# Patient Record
Sex: FEMALE | Race: WHITE | Employment: UNEMPLOYED | URBAN - METROPOLITAN AREA
[De-identification: names, ages, dates, MRNs, and addresses within clinical notes are randomized per-mention and may not be internally consistent; named-entity substitution may affect disease eponyms.]

---

## 2017-04-17 ENCOUNTER — OFFICE VISIT (OUTPATIENT)
Dept: INTERNAL MEDICINE CLINIC | Age: 62
End: 2017-04-17

## 2017-04-17 VITALS
OXYGEN SATURATION: 99 % | BODY MASS INDEX: 27.71 KG/M2 | WEIGHT: 156.4 LBS | HEIGHT: 63 IN | RESPIRATION RATE: 16 BRPM | SYSTOLIC BLOOD PRESSURE: 118 MMHG | HEART RATE: 80 BPM | DIASTOLIC BLOOD PRESSURE: 70 MMHG

## 2017-04-17 DIAGNOSIS — Z12.11 SCREENING FOR COLORECTAL CANCER: ICD-10-CM

## 2017-04-17 DIAGNOSIS — E78.2 MIXED HYPERLIPIDEMIA: ICD-10-CM

## 2017-04-17 DIAGNOSIS — G43.009 MIGRAINE WITHOUT AURA AND WITHOUT STATUS MIGRAINOSUS, NOT INTRACTABLE: ICD-10-CM

## 2017-04-17 DIAGNOSIS — Z00.00 ROUTINE GENERAL MEDICAL EXAMINATION AT A HEALTH CARE FACILITY: Primary | ICD-10-CM

## 2017-04-17 DIAGNOSIS — Z12.12 SCREENING FOR COLORECTAL CANCER: ICD-10-CM

## 2017-04-17 DIAGNOSIS — Z12.31 VISIT FOR SCREENING MAMMOGRAM: ICD-10-CM

## 2017-04-17 PROCEDURE — 99396 PREV VISIT EST AGE 40-64: CPT | Performed by: INTERNAL MEDICINE

## 2017-04-17 RX ORDER — SUMATRIPTAN 100 MG/1
100 TABLET, FILM COATED ORAL 3 TIMES DAILY PRN
Qty: 9 TABLET | Refills: 5 | Status: SHIPPED | OUTPATIENT
Start: 2017-04-17 | End: 2018-07-27 | Stop reason: SDUPTHER

## 2017-04-17 ASSESSMENT — PATIENT HEALTH QUESTIONNAIRE - PHQ9
SUM OF ALL RESPONSES TO PHQ9 QUESTIONS 1 & 2: 0
2. FEELING DOWN, DEPRESSED OR HOPELESS: 0
1. LITTLE INTEREST OR PLEASURE IN DOING THINGS: 0
SUM OF ALL RESPONSES TO PHQ QUESTIONS 1-9: 0

## 2018-02-23 ENCOUNTER — HOSPITAL ENCOUNTER (OUTPATIENT)
Dept: CT IMAGING | Age: 63
Discharge: HOME | End: 2018-02-23
Admitting: NURSE PRACTITIONER

## 2018-02-23 DIAGNOSIS — R93.89 ABNORMAL RADIOLOGICAL FINDINGS IN SKIN AND SUBCUTANEOUS TISSUE: ICD-10-CM

## 2018-02-23 DIAGNOSIS — R79.1 ABNORMAL COAGULATION PROFILE: ICD-10-CM

## 2018-02-23 DIAGNOSIS — R07.9 CHEST PAIN, UNSPECIFIED TYPE: ICD-10-CM

## 2018-02-23 DIAGNOSIS — R06.02 SOB (SHORTNESS OF BREATH): ICD-10-CM

## 2018-02-23 LAB
GFR AFRICAN AMERICAN: >60 ML/MIN/1.73M2
GFR NON-AFRICAN AMERICAN: >60 ML/MIN/1.73M2
POC CREATININE: 0.7 MG/DL (ref 0.6–1.1)

## 2018-06-29 ENCOUNTER — HOSPITAL ENCOUNTER (OUTPATIENT)
Dept: GENERAL RADIOLOGY | Age: 63
Discharge: HOME | End: 2018-06-29
Attending: INTERNAL MEDICINE | Admitting: INTERNAL MEDICINE

## 2018-06-29 DIAGNOSIS — J91.8 PLEURAL EFFUSION ASSOCIATED WITH PANCREATITIS: ICD-10-CM

## 2018-06-29 DIAGNOSIS — K85.90 PLEURAL EFFUSION ASSOCIATED WITH PANCREATITIS: ICD-10-CM

## 2018-06-29 DIAGNOSIS — R07.1 CHEST PAIN MADE WORSE BY BREATHING: ICD-10-CM

## 2018-07-23 ENCOUNTER — HOSPITAL ENCOUNTER (OUTPATIENT)
Dept: CT IMAGING | Age: 63
Discharge: HOME | End: 2018-08-21
Attending: INTERNAL MEDICINE | Admitting: INTERNAL MEDICINE

## 2018-07-23 DIAGNOSIS — R91.8 LUNG MASS: ICD-10-CM

## 2018-07-23 LAB
GFR AFRICAN AMERICAN: >60 ML/MIN/1.73M2
GFR NON-AFRICAN AMERICAN: >60 ML/MIN/1.73M2
POC CREATININE: 0.9 MG/DL (ref 0.6–1.1)

## 2018-07-23 RX ORDER — SODIUM CHLORIDE 0.9 % (FLUSH) 0.9 %
10 SYRINGE (ML) INJECTION PRN
Status: DISCONTINUED | OUTPATIENT
Start: 2018-07-23 | End: 2018-07-24 | Stop reason: HOSPADM

## 2018-07-23 RX ADMIN — Medication 10 ML: at 16:04

## 2018-07-27 ENCOUNTER — OFFICE VISIT (OUTPATIENT)
Dept: INTERNAL MEDICINE CLINIC | Age: 63
End: 2018-07-27

## 2018-07-27 VITALS
DIASTOLIC BLOOD PRESSURE: 72 MMHG | OXYGEN SATURATION: 98 % | WEIGHT: 152.6 LBS | RESPIRATION RATE: 18 BRPM | BODY MASS INDEX: 27.04 KG/M2 | HEART RATE: 62 BPM | SYSTOLIC BLOOD PRESSURE: 126 MMHG | HEIGHT: 63 IN

## 2018-07-27 DIAGNOSIS — E78.2 MIXED HYPERLIPIDEMIA: ICD-10-CM

## 2018-07-27 DIAGNOSIS — Z12.12 SCREENING FOR COLORECTAL CANCER: ICD-10-CM

## 2018-07-27 DIAGNOSIS — I34.1 MITRAL VALVE PROLAPSE: ICD-10-CM

## 2018-07-27 DIAGNOSIS — Z12.11 SCREENING FOR COLORECTAL CANCER: ICD-10-CM

## 2018-07-27 DIAGNOSIS — Z00.00 ROUTINE GENERAL MEDICAL EXAMINATION AT A HEALTH CARE FACILITY: Primary | ICD-10-CM

## 2018-07-27 DIAGNOSIS — G43.009 MIGRAINE WITHOUT AURA AND WITHOUT STATUS MIGRAINOSUS, NOT INTRACTABLE: ICD-10-CM

## 2018-07-27 DIAGNOSIS — Z12.31 VISIT FOR SCREENING MAMMOGRAM: ICD-10-CM

## 2018-07-27 PROCEDURE — 99396 PREV VISIT EST AGE 40-64: CPT | Performed by: INTERNAL MEDICINE

## 2018-07-27 RX ORDER — SUMATRIPTAN 100 MG/1
100 TABLET, FILM COATED ORAL 3 TIMES DAILY PRN
Qty: 9 TABLET | Refills: 5 | Status: SHIPPED | OUTPATIENT
Start: 2018-07-27 | End: 2019-08-09

## 2018-07-27 ASSESSMENT — PATIENT HEALTH QUESTIONNAIRE - PHQ9
1. LITTLE INTEREST OR PLEASURE IN DOING THINGS: 0
SUM OF ALL RESPONSES TO PHQ QUESTIONS 1-9: 0
SUM OF ALL RESPONSES TO PHQ9 QUESTIONS 1 & 2: 0
2. FEELING DOWN, DEPRESSED OR HOPELESS: 0

## 2018-07-27 NOTE — PROGRESS NOTES
Normocephalic and atraumatic. Nose: Nose normal.   Mouth/Throat: Oropharynx is clear and moist, and mucous membranes are normal.  There is no cervical adenopathy. Eyes: Conjunctivae and extraocular motions are normal. Pupils are equal, round, and reactive to light. Neck: Neck supple. No JVD present. No mass and no thyromegaly present. Cardiovascular: Normal rate, regular rhythm, normal heart sounds and intact distal pulses. Exam reveals no gallop and no friction rub. No murmur heard. Pulmonary/Chest: Effort normal and breath sounds normal. No respiratory distress. She has no wheezes, rhonchi or rales. Abdominal: Soft, non-tender. Bowel sounds and aorta are normal. She exhibits no organomegaly, mass or bruit. Musculoskeletal: Normal range of motion, no synovitis. She exhibits no edema. Neurological: She is alert and oriented to person, place, and time. She has normal reflexes. No cranial nerve deficit. Skin: Skin is warm and dry. There is no rash or erythema. Psychiatric: She has a normal mood and affect. Her speech is normal and behavior is normal. Judgment, cognition and memory are normal.         Assessment/Plan:  Luz was seen today for hyperlipidemia and other. 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 Overall general medical exam appears benign, other assessments as noted and testing is as noted below. -     Lipid Panel; Future  -     Comprehensive Metabolic Panel; Future  -     TSH with Reflex; Future  -     CBC Auto Differential; Future  -     BLOOD OCCULT STOOL #1; Future    Mixed hyperlipidemia - Pt will continue to work on a low fat diet and also exercise, wt loss as appropriate. Will continue periodic monitoring of fasting lipid profile, glucose, liver function.    -     Lipid Panel;  Future  -     Comprehensive Metabolic Panel; Future  -     TSH with Reflex; Future  -     CBC Auto Differential; Future  -     BLOOD OCCULT STOOL #1; Future    Mitral valve prolapse-no current sx, monitor. Screening for colorectal cancer- due for stool cards, defers colonoscopy  -     BLOOD OCCULT STOOL #1; Future    Visit for screening mammogram  -     ESTER Screening Bilateral; Future    Migraine without aura and without status migrainosus, not intractable - Migraines sporadic but stable, controlled on meds as noted. Continue current therapy. -     SUMAtriptan (IMITREX) 100 MG tablet;  Take 1 tablet by mouth 3 times daily as needed for Migraine

## 2019-03-20 ENCOUNTER — OFFICE VISIT (OUTPATIENT)
Dept: INTERNAL MEDICINE CLINIC | Age: 64
End: 2019-03-20
Payer: COMMERCIAL

## 2019-03-20 VITALS
HEART RATE: 77 BPM | OXYGEN SATURATION: 97 % | DIASTOLIC BLOOD PRESSURE: 67 MMHG | BODY MASS INDEX: 28.87 KG/M2 | RESPIRATION RATE: 15 BRPM | WEIGHT: 163 LBS | SYSTOLIC BLOOD PRESSURE: 136 MMHG

## 2019-03-20 DIAGNOSIS — J01.00 ACUTE NON-RECURRENT MAXILLARY SINUSITIS: Primary | ICD-10-CM

## 2019-03-20 PROCEDURE — 99213 OFFICE O/P EST LOW 20 MIN: CPT | Performed by: INTERNAL MEDICINE

## 2019-03-20 RX ORDER — PREDNISONE 1 MG/1
TABLET ORAL
Qty: 36 TABLET | Refills: 0 | Status: SHIPPED | OUTPATIENT
Start: 2019-03-20 | End: 2019-08-09 | Stop reason: ALTCHOICE

## 2019-03-20 RX ORDER — AMOXICILLIN 500 MG/1
500 CAPSULE ORAL 2 TIMES DAILY
Qty: 20 CAPSULE | Refills: 0 | Status: SHIPPED | OUTPATIENT
Start: 2019-03-20 | End: 2019-03-30

## 2019-03-20 ASSESSMENT — PATIENT HEALTH QUESTIONNAIRE - PHQ9
2. FEELING DOWN, DEPRESSED OR HOPELESS: 0
SUM OF ALL RESPONSES TO PHQ9 QUESTIONS 1 & 2: 0
DEPRESSION UNABLE TO ASSESS: FUNCTIONAL CAPACITY MOTIVATION LIMITS ACCURACY
SUM OF ALL RESPONSES TO PHQ QUESTIONS 1-9: 0
1. LITTLE INTEREST OR PLEASURE IN DOING THINGS: 0
SUM OF ALL RESPONSES TO PHQ QUESTIONS 1-9: 0

## 2019-08-09 ENCOUNTER — OFFICE VISIT (OUTPATIENT)
Dept: INTERNAL MEDICINE CLINIC | Age: 64
End: 2019-08-09
Payer: COMMERCIAL

## 2019-08-09 VITALS
RESPIRATION RATE: 15 BRPM | HEIGHT: 63 IN | DIASTOLIC BLOOD PRESSURE: 83 MMHG | HEART RATE: 67 BPM | BODY MASS INDEX: 28.7 KG/M2 | WEIGHT: 162 LBS | OXYGEN SATURATION: 98 % | SYSTOLIC BLOOD PRESSURE: 138 MMHG

## 2019-08-09 DIAGNOSIS — E78.2 MIXED HYPERLIPIDEMIA: ICD-10-CM

## 2019-08-09 DIAGNOSIS — Z12.11 SCREENING FOR COLORECTAL CANCER: ICD-10-CM

## 2019-08-09 DIAGNOSIS — Z12.12 SCREENING FOR COLORECTAL CANCER: ICD-10-CM

## 2019-08-09 DIAGNOSIS — G43.009 MIGRAINE WITHOUT AURA AND WITHOUT STATUS MIGRAINOSUS, NOT INTRACTABLE: ICD-10-CM

## 2019-08-09 DIAGNOSIS — Z12.31 VISIT FOR SCREENING MAMMOGRAM: ICD-10-CM

## 2019-08-09 DIAGNOSIS — Z00.00 ROUTINE GENERAL MEDICAL EXAMINATION AT A HEALTH CARE FACILITY: Primary | ICD-10-CM

## 2019-08-09 PROCEDURE — 99396 PREV VISIT EST AGE 40-64: CPT | Performed by: INTERNAL MEDICINE

## 2019-08-09 RX ORDER — SUMATRIPTAN 100 MG/1
100 TABLET, FILM COATED ORAL 3 TIMES DAILY PRN
Qty: 9 TABLET | Refills: 5 | Status: SHIPPED | OUTPATIENT
Start: 2019-08-09 | End: 2021-10-21

## 2019-08-09 NOTE — PROGRESS NOTES
Internal Medicine  History and Physical        Luz Elliott  YOB: 1955    Date of Service:  8/9/2019      Chief Complaint:   Vero Soto is a 60 y.o. female who presents for a comprehensive physical    HPI:   Lipids:  Is continuing statin therapy and low fat diet. Tolerating medications w/o myalgias or GI upset. Work less stressful and now minimal use imitrex. Soda, coke ~2/d. Prior max 5-6/day. S/p complete hysterectomy, seeing DR Kaia Hernandez now q5 yrs. Patient Active Problem List   Diagnosis    Migraine headache    Allergic rhinitis    Hyperlipidemia    Mitral valve prolapse    S/P total hysterectomy and BSO (bilateral salpingo-oophorectomy)       Preventive Care:  Health Maintenance   Topic Date Due    Hepatitis C screen  1955    HIV screen  01/30/1972    DTaP/Tdap/Td vaccine (1 - Tdap) 01/30/1976    Shingles Vaccine (1 of 2) 01/30/2007    Colon cancer screen colonoscopy  01/30/2007    Breast cancer screen  09/30/2017    Flu vaccine (1) 09/01/2019    Lipid screen  12/16/2021    Pneumococcal 0-64 years Vaccine  Aged Out        Lipid panel:   Lab Results   Component Value Date    TRIG 200 12/16/2016    HDL 53 12/16/2016        Immunization History   Administered Date(s) Administered    Influenza 12/04/2012       Allergies   Allergen Reactions    Codeine Nausea And Vomiting    Crestor [Rosuvastatin Calcium]      myalgias    Lipitor      Elevated alk phos     No current outpatient medications on file. No current facility-administered medications for this visit.         Past Medical History:   Diagnosis Date    Allergic rhinitis     Elevated alkaline phosphatase level     H/O echocardiogram 7/23/13 7/13-EF >55% Borderline MVP, Trace MR.     Hip pain     Hyperlipidemia     Left hand pain     injury w hand stuck in an auger  early May 2012    Menopause     Migraine headache     Mitral valve prolapse     mild on echo 7/23/13    Posterior vitreous

## 2020-08-05 ENCOUNTER — TELEPHONE (OUTPATIENT)
Dept: INTERNAL MEDICINE CLINIC | Age: 65
End: 2020-08-05

## 2020-12-14 ENCOUNTER — OFFICE VISIT (OUTPATIENT)
Dept: INTERNAL MEDICINE CLINIC | Age: 65
End: 2020-12-14
Payer: COMMERCIAL

## 2020-12-14 VITALS
HEIGHT: 63 IN | OXYGEN SATURATION: 98 % | HEART RATE: 78 BPM | SYSTOLIC BLOOD PRESSURE: 124 MMHG | BODY MASS INDEX: 28.7 KG/M2 | RESPIRATION RATE: 14 BRPM | DIASTOLIC BLOOD PRESSURE: 72 MMHG

## 2020-12-14 PROCEDURE — 99213 OFFICE O/P EST LOW 20 MIN: CPT | Performed by: INTERNAL MEDICINE

## 2020-12-14 RX ORDER — VALACYCLOVIR HYDROCHLORIDE 1 G/1
1000 TABLET, FILM COATED ORAL 3 TIMES DAILY
Qty: 21 TABLET | Refills: 1 | Status: SHIPPED | OUTPATIENT
Start: 2020-12-14 | End: 2020-12-21

## 2020-12-14 RX ORDER — CEPHALEXIN 500 MG/1
500 CAPSULE ORAL 2 TIMES DAILY
Qty: 14 CAPSULE | Refills: 0 | Status: SHIPPED | OUTPATIENT
Start: 2020-12-14 | End: 2020-12-21

## 2020-12-14 ASSESSMENT — PATIENT HEALTH QUESTIONNAIRE - PHQ9
1. LITTLE INTEREST OR PLEASURE IN DOING THINGS: 0
SUM OF ALL RESPONSES TO PHQ QUESTIONS 1-9: 0
2. FEELING DOWN, DEPRESSED OR HOPELESS: 0
SUM OF ALL RESPONSES TO PHQ QUESTIONS 1-9: 0
SUM OF ALL RESPONSES TO PHQ9 QUESTIONS 1 & 2: 0
SUM OF ALL RESPONSES TO PHQ QUESTIONS 1-9: 0

## 2020-12-14 NOTE — LETTER
[facility] INTERNAL MEDICINE  [address] [address]  Phone: [phone]  Fax: [phone]    Nicolette Marrero MD        December 14, 2020     Patient: Fco Nicole   YOB: 1955   Date of Visit: 12/14/2020       To Whom it May Concern:    Kinza Hutton was seen in my clinic on 12/14/2020. She may return to work on 12/23/20. If you have any questions or concerns, please don't hesitate to call.     Sincerely,         Nicolette Marrero MD

## 2020-12-14 NOTE — PROGRESS NOTES
Fco Nicole  1955 12/14/20    SUBJECTIVE:  5d hx of sudden onset tender burning and itching cluster lesions R cheek and some spread of redness to R medial cheek to nose. OBJECTIVE:    /72   Pulse 78   Resp 14   Ht 5' 3\" (1.6 m)   SpO2 98%   BMI 28.70 kg/m²     Physical Exam  Constitutional:       Appearance: Normal appearance. HENT:      Head:     Skin:     Findings: Erythema and rash present. Neurological:      Mental Status: She is alert. ASSESSMENT:    1. Facial cellulitis    2. Herpes zoster without complication        PLAN:  BACTERIAL INFECTION ON DDX BUT I'M MOST CONCERNED ABOUT POSSIBLE SHINGLES. WORK EXC GIVEN FOR THIS WEEK MON TO SUN, RTW NEXT MON. EMPIRIC RX ABX AND VALTREX, To call back one week if not improving. Orvel Snellen ALSO IN 6MO  Luz was seen today for insect bite. Diagnoses and all orders for this visit:    Facial cellulitis  -     cephALEXin (KEFLEX) 500 MG capsule; Take 1 capsule by mouth 2 times daily for 7 days  -     valACYclovir (VALTREX) 1 g tablet; Take 1 tablet by mouth 3 times daily for 7 days    Herpes zoster without complication  -     valACYclovir (VALTREX) 1 g tablet; Take 1 tablet by mouth 3 times daily for 7 days    Other orders  -     zoster recombinant adjuvanted vaccine [facility]) 50 MCG/0.5ML SUSR injection;  Inject 0.5 mLs into the muscle once for 1 dose

## 2021-04-28 ENCOUNTER — TELEPHONE (OUTPATIENT)
Dept: INTERNAL MEDICINE CLINIC | Age: 66
End: 2021-04-28

## 2021-07-29 ENCOUNTER — TELEPHONE (OUTPATIENT)
Dept: INTERNAL MEDICINE CLINIC | Age: 66
End: 2021-07-29

## 2021-10-21 ENCOUNTER — APPOINTMENT (OUTPATIENT)
Dept: GENERAL RADIOLOGY | Age: 66
End: 2021-10-21

## 2021-10-21 ENCOUNTER — APPOINTMENT (OUTPATIENT)
Dept: CT IMAGING | Age: 66
End: 2021-10-21

## 2021-10-21 ENCOUNTER — HOSPITAL ENCOUNTER (OUTPATIENT)
Age: 66
Setting detail: OBSERVATION
Discharge: HOME | End: 2021-10-22
Attending: STUDENT IN AN ORGANIZED HEALTH CARE EDUCATION/TRAINING PROGRAM | Admitting: STUDENT IN AN ORGANIZED HEALTH CARE EDUCATION/TRAINING PROGRAM

## 2021-10-21 DIAGNOSIS — R51.9 NONINTRACTABLE HEADACHE, UNSPECIFIED CHRONICITY PATTERN, UNSPECIFIED HEADACHE TYPE: ICD-10-CM

## 2021-10-21 DIAGNOSIS — R00.2 PALPITATIONS: ICD-10-CM

## 2021-10-21 DIAGNOSIS — R03.0 ELEVATED BLOOD PRESSURE READING: ICD-10-CM

## 2021-10-21 DIAGNOSIS — R53.83 OTHER FATIGUE: Primary | ICD-10-CM

## 2021-10-21 DIAGNOSIS — J18.9 PNEUMONIA OF RIGHT MIDDLE LOBE DUE TO INFECTIOUS ORGANISM: ICD-10-CM

## 2021-10-21 PROBLEM — R94.31 EKG ABNORMALITIES: Status: ACTIVE | Noted: 2021-10-21

## 2021-10-21 LAB
ALBUMIN SERPL-MCNC: 4.5 GM/DL (ref 3.4–5)
ALP BLD-CCNC: 155 IU/L (ref 40–129)
ALT SERPL-CCNC: 29 U/L (ref 10–40)
ANION GAP SERPL CALCULATED.3IONS-SCNC: 10 MMOL/L (ref 4–16)
APTT: 31 SECONDS (ref 25.1–37.1)
AST SERPL-CCNC: 22 IU/L (ref 15–37)
BASOPHILS ABSOLUTE: 0.1 K/CU MM
BASOPHILS RELATIVE PERCENT: 0.7 % (ref 0–1)
BILIRUB SERPL-MCNC: 0.7 MG/DL (ref 0–1)
BILIRUBIN DIRECT: 0.2 MG/DL (ref 0–0.3)
BILIRUBIN, INDIRECT: 0.5 MG/DL (ref 0–0.7)
BUN BLDV-MCNC: 15 MG/DL (ref 6–23)
CALCIUM SERPL-MCNC: 10.1 MG/DL (ref 8.3–10.6)
CHLORIDE BLD-SCNC: 104 MMOL/L (ref 99–110)
CO2: 25 MMOL/L (ref 21–32)
CREAT SERPL-MCNC: 0.6 MG/DL (ref 0.6–1.1)
DIFFERENTIAL TYPE: ABNORMAL
EOSINOPHILS ABSOLUTE: 0 K/CU MM
EOSINOPHILS RELATIVE PERCENT: 0.6 % (ref 0–3)
GFR AFRICAN AMERICAN: >60 ML/MIN/1.73M2
GFR NON-AFRICAN AMERICAN: >60 ML/MIN/1.73M2
GLUCOSE BLD-MCNC: 125 MG/DL (ref 70–99)
HCT VFR BLD CALC: 46 % (ref 37–47)
HEMOGLOBIN: 15 GM/DL (ref 12.5–16)
IMMATURE NEUTROPHIL %: 0.8 % (ref 0–0.43)
INR BLD: 0.82 INDEX
LYMPHOCYTES ABSOLUTE: 1.7 K/CU MM
LYMPHOCYTES RELATIVE PERCENT: 23.4 % (ref 24–44)
MCH RBC QN AUTO: 31.4 PG (ref 27–31)
MCHC RBC AUTO-ENTMCNC: 32.6 % (ref 32–36)
MCV RBC AUTO: 96.4 FL (ref 78–100)
MONOCYTES ABSOLUTE: 0.8 K/CU MM
MONOCYTES RELATIVE PERCENT: 10.7 % (ref 0–4)
NUCLEATED RBC %: 0 %
PDW BLD-RTO: 12.5 % (ref 11.7–14.9)
PLATELET # BLD: 315 K/CU MM (ref 140–440)
PMV BLD AUTO: 9.3 FL (ref 7.5–11.1)
POTASSIUM SERPL-SCNC: 3.8 MMOL/L (ref 3.5–5.1)
PRO-BNP: 55.34 PG/ML
PROCALCITONIN: 0.06
PROTHROMBIN TIME: 10.6 SECONDS (ref 11.7–14.5)
RBC # BLD: 4.77 M/CU MM (ref 4.2–5.4)
SARS-COV-2, NAAT: NOT DETECTED
SEGMENTED NEUTROPHILS ABSOLUTE COUNT: 4.6 K/CU MM
SEGMENTED NEUTROPHILS RELATIVE PERCENT: 63.8 % (ref 36–66)
SODIUM BLD-SCNC: 139 MMOL/L (ref 135–145)
SOURCE: NORMAL
T4 FREE: 1.17 NG/DL (ref 0.9–1.8)
TOTAL IMMATURE NEUTOROPHIL: 0.06 K/CU MM
TOTAL NUCLEATED RBC: 0 K/CU MM
TOTAL PROTEIN: 6.9 GM/DL (ref 6.4–8.2)
TROPONIN T: <0.01 NG/ML
TSH HIGH SENSITIVITY: 1.04 UIU/ML (ref 0.27–4.2)
WBC # BLD: 7.1 K/CU MM (ref 4–10.5)

## 2021-10-21 PROCEDURE — 93005 ELECTROCARDIOGRAM TRACING: CPT | Performed by: EMERGENCY MEDICINE

## 2021-10-21 PROCEDURE — 84484 ASSAY OF TROPONIN QUANT: CPT

## 2021-10-21 PROCEDURE — 80048 BASIC METABOLIC PNL TOTAL CA: CPT

## 2021-10-21 PROCEDURE — 85730 THROMBOPLASTIN TIME PARTIAL: CPT

## 2021-10-21 PROCEDURE — 84439 ASSAY OF FREE THYROXINE: CPT

## 2021-10-21 PROCEDURE — 99285 EMERGENCY DEPT VISIT HI MDM: CPT

## 2021-10-21 PROCEDURE — G0378 HOSPITAL OBSERVATION PER HR: HCPCS

## 2021-10-21 PROCEDURE — 87635 SARS-COV-2 COVID-19 AMP PRB: CPT

## 2021-10-21 PROCEDURE — 84145 PROCALCITONIN (PCT): CPT

## 2021-10-21 PROCEDURE — 71046 X-RAY EXAM CHEST 2 VIEWS: CPT

## 2021-10-21 PROCEDURE — 70450 CT HEAD/BRAIN W/O DYE: CPT

## 2021-10-21 PROCEDURE — 80076 HEPATIC FUNCTION PANEL: CPT

## 2021-10-21 PROCEDURE — 84443 ASSAY THYROID STIM HORMONE: CPT

## 2021-10-21 PROCEDURE — 83880 ASSAY OF NATRIURETIC PEPTIDE: CPT

## 2021-10-21 PROCEDURE — 85025 COMPLETE CBC W/AUTO DIFF WBC: CPT

## 2021-10-21 PROCEDURE — 6370000000 HC RX 637 (ALT 250 FOR IP): Performed by: INTERNAL MEDICINE

## 2021-10-21 PROCEDURE — 36415 COLL VENOUS BLD VENIPUNCTURE: CPT

## 2021-10-21 PROCEDURE — 85610 PROTHROMBIN TIME: CPT

## 2021-10-21 RX ORDER — CARVEDILOL 6.25 MG/1
12.5 TABLET ORAL 2 TIMES DAILY WITH MEALS
Status: DISCONTINUED | OUTPATIENT
Start: 2021-10-21 | End: 2021-10-22 | Stop reason: HOSPADM

## 2021-10-21 RX ORDER — ASPIRIN 81 MG/1
81 TABLET, CHEWABLE ORAL DAILY
Status: DISCONTINUED | OUTPATIENT
Start: 2021-10-22 | End: 2021-10-22 | Stop reason: HOSPADM

## 2021-10-21 RX ORDER — ONDANSETRON 4 MG/1
4 TABLET, ORALLY DISINTEGRATING ORAL EVERY 8 HOURS PRN
Status: DISCONTINUED | OUTPATIENT
Start: 2021-10-21 | End: 2021-10-22 | Stop reason: HOSPADM

## 2021-10-21 RX ORDER — SODIUM CHLORIDE 0.9 % (FLUSH) 0.9 %
5-40 SYRINGE (ML) INJECTION PRN
Status: DISCONTINUED | OUTPATIENT
Start: 2021-10-21 | End: 2021-10-22 | Stop reason: HOSPADM

## 2021-10-21 RX ORDER — ACETAMINOPHEN 650 MG/1
650 SUPPOSITORY RECTAL EVERY 6 HOURS PRN
Status: DISCONTINUED | OUTPATIENT
Start: 2021-10-21 | End: 2021-10-22 | Stop reason: HOSPADM

## 2021-10-21 RX ORDER — NITROGLYCERIN 0.4 MG/1
0.4 TABLET SUBLINGUAL EVERY 5 MIN PRN
Status: DISCONTINUED | OUTPATIENT
Start: 2021-10-21 | End: 2021-10-22 | Stop reason: HOSPADM

## 2021-10-21 RX ORDER — SODIUM CHLORIDE 0.9 % (FLUSH) 0.9 %
5-40 SYRINGE (ML) INJECTION EVERY 12 HOURS SCHEDULED
Status: DISCONTINUED | OUTPATIENT
Start: 2021-10-21 | End: 2021-10-22 | Stop reason: HOSPADM

## 2021-10-21 RX ORDER — LISINOPRIL 5 MG/1
5 TABLET ORAL DAILY
Status: DISCONTINUED | OUTPATIENT
Start: 2021-10-21 | End: 2021-10-22

## 2021-10-21 RX ORDER — POLYETHYLENE GLYCOL 3350 17 G/17G
17 POWDER, FOR SOLUTION ORAL DAILY PRN
Status: DISCONTINUED | OUTPATIENT
Start: 2021-10-21 | End: 2021-10-22 | Stop reason: HOSPADM

## 2021-10-21 RX ORDER — LABETALOL HYDROCHLORIDE 5 MG/ML
5 INJECTION, SOLUTION INTRAVENOUS EVERY 10 MIN PRN
Status: DISCONTINUED | OUTPATIENT
Start: 2021-10-21 | End: 2021-10-22 | Stop reason: HOSPADM

## 2021-10-21 RX ORDER — SODIUM CHLORIDE 9 MG/ML
25 INJECTION, SOLUTION INTRAVENOUS PRN
Status: DISCONTINUED | OUTPATIENT
Start: 2021-10-21 | End: 2021-10-22 | Stop reason: HOSPADM

## 2021-10-21 RX ORDER — ACETAMINOPHEN 325 MG/1
650 TABLET ORAL EVERY 6 HOURS PRN
Status: DISCONTINUED | OUTPATIENT
Start: 2021-10-21 | End: 2021-10-22 | Stop reason: HOSPADM

## 2021-10-21 RX ORDER — ONDANSETRON 2 MG/ML
4 INJECTION INTRAMUSCULAR; INTRAVENOUS EVERY 6 HOURS PRN
Status: DISCONTINUED | OUTPATIENT
Start: 2021-10-21 | End: 2021-10-22 | Stop reason: HOSPADM

## 2021-10-21 RX ADMIN — LISINOPRIL 5 MG: 5 TABLET ORAL at 20:10

## 2021-10-21 RX ADMIN — CARVEDILOL 12.5 MG: 6.25 TABLET, FILM COATED ORAL at 20:10

## 2021-10-21 ASSESSMENT — ENCOUNTER SYMPTOMS
NAUSEA: 1
COUGH: 1
COLOR CHANGE: 0
VOICE CHANGE: 0
DIARRHEA: 0
CHEST TIGHTNESS: 0
SORE THROAT: 0
VOMITING: 0
ABDOMINAL PAIN: 0
RHINORRHEA: 0
BLOOD IN STOOL: 0
BACK PAIN: 0
CONSTIPATION: 0
SINUS PRESSURE: 0
WHEEZING: 0
TROUBLE SWALLOWING: 0
SINUS PAIN: 0
SHORTNESS OF BREATH: 0

## 2021-10-21 NOTE — ED NOTES
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 Pt states she doesn't have a hx of HTN but states she has a strong family hx of HTN. Pt reports that her BP was 168/94 at the office and they asked if they could do an EKG on her. Pt states the EKG was \"abnormal\" and was told to come to ED for evaluation and to be seen by Dr Fabrice Farias. Pt denies any chest pain or SOB. Pt states she did feel dizzy yesterday but denies today. Pt reports she takes no medications on a daily basis.       Radha Davenport, RN  10/21/21 1946

## 2021-10-21 NOTE — Clinical Note
Patient Class: Observation [104]   REQUIRED: Diagnosis: EKG abnormalities [827640]   Estimated Length of Stay: Estimated stay of less than 2 midnights   Admitting Provider: Christianne Herron [[id number]]   Preferred Department: ed obs   Telemetry/Cardiac Monitoring Required?: Yes

## 2021-10-21 NOTE — CONSULTS
Dictated --[id number]  Discussed with PA-in ER  Check ct head and labs  Will make decision based on ct and labs    Electronically signed by Kim Wright MD on 10/21/21 at 3:45 PM EDT

## 2021-10-21 NOTE — CONSULTS
[address] [place] [address], [address] [facility]                                  CONSULTATION    PATIENT NAME: Christine Marcelino                       :        1955  MED REC NO:   [id number]                          ROOM:  ACCOUNT NO:   [de-identified]                           ADMIT DATE: 10/21/2021  PROVIDER:     Regan Pelayo MD    CONSULT DATE:  10/21/2021    HISTORY OF PRESENT ILLNESS:  This is a 42-year-old female patient, who  was seen in the office and sent from the office for further evaluation. The patient works _____. The patient has been having headache for the  last 3 days and dizziness present. She came to the office and her blood  pressure was checked, it was 168/98. She was also complaining of having  palpitations and shortness of breath present. Therefore, she was sent  to the hospital for further evaluation. Her EKG has some ST changes  present. The patient had an echo done back in  and I believe her echo was  normal at that time. She did have a stress test done in  and her  stress test was negative for ischemia. LV function also was preserved. The patient does not take any medications. PAST MEDICAL HISTORY:  She has no history of stroke. No history of  seizures. No history of MI. She does have new onset of hypertension  present. She has had elevated LFTs secondary to statin and I think that  she stopped taking that and it has improved. She had a stress test done  in  and the stress test was negative for ischemia. It was a  treadmill stress test and for 9 minutes, she walked on the treadmill. No EKG changes were noted. PAST SURGICAL HISTORY:  She had a gallbladder surgery and hysterectomy. SOCIAL HISTORY:  She smokes. ALLERGIES:  CRESTOR, CALCIUM, and LIPITOR. MEDICATIONS:  She is on Imitrex.     PHYSICAL EXAMINATION:  GENERAL:  The patient is awake, alert, and answers all questions, not in  acute distress. VITAL SIGNS:  Temperature afebrile, pulse is 75, blood pressure is  160/90. HEENT:  Head is normocephalic and atraumatic. Pupils are equal and  reactive to light. CHEST:  Equal expansion. LUNGS:  Clear to auscultation. No wheezing or rhonchi noted. HEART:  Regular rate and rhythm. ABDOMEN:  Soft and nontender. Bowel sounds are present. No  hepatosplenomegaly or guarding appreciated. EXTREMITIES:  No cyanosis noted. NEUROLOGIC:  Cranial nerves are grossly intact. DIAGNOSTIC STUDIES:  Her EKG is pending. IMPRESSION:  A 62-year-old female patient who comes in with having  dizziness, lightheadedness and headache present, and also elevated blood  pressure. PLAN:  At this time, I will wait for her CT of the head and labs to come  back and we will make further recommendations based on that. We will  probably obtain an echo. We will start blood pressure medication.    Discussed with the patient and also the ER provider and the PA.        Isha Jorge MD    D: 10/21/2021 15:44:20       T: 10/21/2021 15:47:24     [email or web address]  Job#: [id number]     Doc#: [id number]    CC:

## 2021-10-22 ENCOUNTER — APPOINTMENT (OUTPATIENT)
Dept: NUCLEAR MEDICINE | Age: 66
End: 2021-10-22

## 2021-10-22 VITALS
OXYGEN SATURATION: 97 % | WEIGHT: 154.76 LBS | RESPIRATION RATE: 16 BRPM | HEIGHT: 63 IN | HEART RATE: 56 BPM | BODY MASS INDEX: 27.42 KG/M2 | SYSTOLIC BLOOD PRESSURE: 119 MMHG | TEMPERATURE: 97.6 F | DIASTOLIC BLOOD PRESSURE: 61 MMHG

## 2021-10-22 LAB
CHOLESTEROL: 236 MG/DL
EKG ATRIAL RATE: 51 BPM
EKG ATRIAL RATE: 65 BPM
EKG DIAGNOSIS: NORMAL
EKG DIAGNOSIS: NORMAL
EKG P AXIS: 66 DEGREES
EKG P AXIS: 68 DEGREES
EKG P-R INTERVAL: 168 MS
EKG P-R INTERVAL: 182 MS
EKG Q-T INTERVAL: 398 MS
EKG Q-T INTERVAL: 474 MS
EKG QRS DURATION: 84 MS
EKG QRS DURATION: 86 MS
EKG QTC CALCULATION (BAZETT): 413 MS
EKG QTC CALCULATION (BAZETT): 436 MS
EKG R AXIS: 38 DEGREES
EKG R AXIS: 46 DEGREES
EKG T AXIS: 70 DEGREES
EKG T AXIS: 70 DEGREES
EKG VENTRICULAR RATE: 51 BPM
EKG VENTRICULAR RATE: 65 BPM
HCT VFR BLD CALC: 42.2 % (ref 37–47)
HDLC SERPL-MCNC: 49 MG/DL
HEMOGLOBIN: 13.7 GM/DL (ref 12.5–16)
LDL CHOLESTEROL DIRECT: 164 MG/DL
LV EF: 53 %
LV EF: 63 %
LVEF MODALITY: NORMAL
LVEF MODALITY: NORMAL
MCH RBC QN AUTO: 31.4 PG (ref 27–31)
MCHC RBC AUTO-ENTMCNC: 32.5 % (ref 32–36)
MCV RBC AUTO: 96.6 FL (ref 78–100)
PDW BLD-RTO: 12.6 % (ref 11.7–14.9)
PLATELET # BLD: 284 K/CU MM (ref 140–440)
PMV BLD AUTO: 9.1 FL (ref 7.5–11.1)
RBC # BLD: 4.37 M/CU MM (ref 4.2–5.4)
TRIGL SERPL-MCNC: 158 MG/DL
TROPONIN T: <0.01 NG/ML
TROPONIN T: <0.01 NG/ML
WBC # BLD: 7.4 K/CU MM (ref 4–10.5)

## 2021-10-22 PROCEDURE — 80061 LIPID PANEL: CPT

## 2021-10-22 PROCEDURE — 36415 COLL VENOUS BLD VENIPUNCTURE: CPT

## 2021-10-22 PROCEDURE — 2580000003 HC RX 258: Performed by: STUDENT IN AN ORGANIZED HEALTH CARE EDUCATION/TRAINING PROGRAM

## 2021-10-22 PROCEDURE — 93017 CV STRESS TEST TRACING ONLY: CPT

## 2021-10-22 PROCEDURE — G0378 HOSPITAL OBSERVATION PER HR: HCPCS

## 2021-10-22 PROCEDURE — 84484 ASSAY OF TROPONIN QUANT: CPT

## 2021-10-22 PROCEDURE — 78452 HT MUSCLE IMAGE SPECT MULT: CPT

## 2021-10-22 PROCEDURE — 93010 ELECTROCARDIOGRAM REPORT: CPT | Performed by: INTERNAL MEDICINE

## 2021-10-22 PROCEDURE — 85027 COMPLETE CBC AUTOMATED: CPT

## 2021-10-22 PROCEDURE — 93306 TTE W/DOPPLER COMPLETE: CPT

## 2021-10-22 PROCEDURE — 3430000000 HC RX DIAGNOSTIC RADIOPHARMACEUTICAL: Performed by: PHYSICIAN ASSISTANT

## 2021-10-22 PROCEDURE — 83721 ASSAY OF BLOOD LIPOPROTEIN: CPT

## 2021-10-22 PROCEDURE — 94761 N-INVAS EAR/PLS OXIMETRY MLT: CPT

## 2021-10-22 PROCEDURE — A9500 TC99M SESTAMIBI: HCPCS | Performed by: PHYSICIAN ASSISTANT

## 2021-10-22 PROCEDURE — 93005 ELECTROCARDIOGRAM TRACING: CPT | Performed by: STUDENT IN AN ORGANIZED HEALTH CARE EDUCATION/TRAINING PROGRAM

## 2021-10-22 RX ORDER — ASPIRIN 81 MG/1
81 TABLET, CHEWABLE ORAL DAILY
Qty: 30 TABLET | Refills: 0 | Status: SHIPPED | OUTPATIENT
Start: 2021-10-23

## 2021-10-22 RX ORDER — CARVEDILOL 12.5 MG/1
12.5 TABLET ORAL 2 TIMES DAILY WITH MEALS
Qty: 60 TABLET | Refills: 0 | Status: SHIPPED | OUTPATIENT
Start: 2021-10-22 | End: 2021-10-25 | Stop reason: SDUPTHER

## 2021-10-22 RX ADMIN — KIT FOR THE PREPARATION OF TECHNETIUM TC99M SESTAMIBI 10 MILLICURIE: 1 INJECTION, POWDER, LYOPHILIZED, FOR SOLUTION PARENTERAL at 10:06

## 2021-10-22 RX ADMIN — KIT FOR THE PREPARATION OF TECHNETIUM TC99M SESTAMIBI 30 MILLICURIE: 1 INJECTION, POWDER, LYOPHILIZED, FOR SOLUTION PARENTERAL at 10:05

## 2021-10-22 RX ADMIN — SODIUM CHLORIDE, PRESERVATIVE FREE 10 ML: 5 INJECTION INTRAVENOUS at 10:39

## 2021-10-22 NOTE — H&P
History and Physical      Name:  Pete Agosto /Age/Sex: 1955  (61 y.o. female)   MRN & CSN:  [id number] & [id number] Admission Date/Time: 10/21/2021  7:30 PM   Location:  01TR- PCP: Kelley Mora MD       Hospital Day: 1    Assessment and Plan:   Pete Agosto is a 61 y.o.  female  who presents with EKG abnormalities    1. EKG abnormalities  2. Hypertension, new  3. HLD, allergy to statins  4. Palpitations  -Admit to observation unit with telemetry  -Patient was evaluated at Dr. Bibi Kong office prior to being sent in.  ED consulted on the patient in the ED would like patient to be evaluated with echo in a.m. and he started her on carvedilol and lisinopril. Watch for any decrease in renal function. -EKG findings consistent with ST abnormalities  -Denies any chest pain  -Troponin <0.010 in ED, cycle troponin x2 q6  -Start ASA, Continue Beta blocker and ACE inhibitor. Allergy to statins  -SL nitro prn  -Nasal cannula oxygen prn  -Check lipid panel. TSH 1.040 and T4 1.17  -NPO after midnight  -2D Echo in AM  -Labetalol 5 mg IV push as needed HTN for SBP greater than 220 or DBP greater than 120 with hold precautions. 5. Dizziness, likely BPPV  -Patient had dizziness yesterday that was present at rest and with eyes closed. No history of vertigo or prior CVAs.  -Dizziness was present when she went to sleep on 10/20/2021 and was resolved upon waking 10/21/2021.  -CT head without contrast shows no acute intracranial abnormalities  -Neuro exam unremarkable no nystagmus noted.  -Low suspicion for posterior CVA. We will evaluate with neuro checks overnight.  -Can consider MRI in a.m. if warranted  -Patient notified to tell nursing staff if she has any further incidences. 6. Tobacco abuse  7.  Radiographic evidence of pneumonia  -Educate patient on importance of smoking cessation for risk factor reduction  -Radiographic evidence of right middle lobe infiltrate although patient has only complaint of \"smoker's cough\" without sputum production. Denies fevers, chills, or shortness of breath. No physical exam findings concerning for pneumonia.  -Procalcitonin 0.062 and Covid negative.  -Withholding antibiotics at this time  -Notified patient to update nursing staff if she has any shortness of breath or urine production    Other chronic medical conditions:   Migraines   Persistent alkaline phosphatase elevation    Diet No diet orders on file   DVT Prophylaxis [x] Lovenox, []  Heparin, [] SCDs, [] Ambulation   GI Prophylaxis [] PPI,  [] H2 Blocker,  [] Carafate,  [] Diet/Tube Feeds   Code Status No Order   Disposition Patient requires continued admission due to EKG abnormalities   MDM [] Low, [] Moderate,[x]  High  Patient's risk as above due to acuity of condition with potential for decompensation. History of Present Illness:     Chief Complaint: EKG abnormalities    Mike Tobias is a 61 y.o.  female with a reviewed and a contributory family history of hypertension and heart disease and a PMH as stated above, who presents with complaints of fatigue, headaches, dizziness, and hypertension. Patient states symptoms began on 10/19/2021 when the patient woke up with a migraine headache that was not worse of her life or sudden onset. States she took her over-the-counter medications which did not resolve the migraine. Patient again woke up the next day with a similar headache but that evening had a spell of dizziness. Patient states \"the room was spinning despite sitting still, even when I close my eyes the room was still spinning and that was nauseous. \"  Patient states she has had no prior history of vertigo or CVAs. Patient states she went to bed yesterday and still had the dizziness but when she awoke this morning dizziness was completely resolved. Patient denied any tinnitus, blurry vision, facial droop, dysarthria, unilateral numbness or weakness, or chest pain prior to this event.   Patient states today she still had the headache and was concerned about her blood pressure as her relatives with migraines have elevated blood pressure during the event. She states she has a family history of cardiac disease as well and she was concerned about that. She states she went next-door to the cardiologist office who checked her blood pressure which was 168/94 with no history of prior hypertension. Patient states they did an EKG and that it was abnormal and to go to the ED department. Patient has endorsed occasional episodes of \"skipped beats\" but overtly denies chest pain, chest heaviness, left arm/jaw pain, shortness of breath, or diaphoresis. Patient does endorse chronic smoker's cough but denies shortness of breath, fevers, or chills. Otherwise patient denies abdominal pain, emesis, diarrhea, dark tarry stool, blood per rectum, dysuria, frequency, or urgency. Discussed case with ED provider. ROS:   Review of Systems   Constitutional: Positive for fatigue. Negative for appetite change, chills and fever. HENT: Negative for congestion, hearing loss, rhinorrhea, sinus pressure, sinus pain, sore throat, tinnitus, trouble swallowing and voice change. Eyes: Negative for visual disturbance. Respiratory: Positive for cough. Negative for chest tightness, shortness of breath and wheezing. Cardiovascular: Positive for palpitations. Negative for chest pain and leg swelling. Gastrointestinal: Positive for nausea. Negative for abdominal pain, blood in stool, constipation, diarrhea and vomiting. Genitourinary: Negative for dysuria, frequency, hematuria and urgency. Musculoskeletal: Negative for arthralgias and back pain. Skin: Negative for color change, pallor and rash. Neurological: Positive for dizziness (Not present today) and headaches. Negative for tremors, seizures, syncope, facial asymmetry, speech difficulty, weakness, light-headedness and numbness.    Psychiatric/Behavioral: Negative for confusion. Objective:   No intake or output data in the 24 hours ending 10/21/21 2147   Vitals:   Vitals:    10/21/21 2016   BP: (!) 166/92   Pulse: 85   Resp: (!) 31   Temp:    SpO2: 98%     BP (!) 166/92   Pulse 85   Temp 98.4 °F (36.9 °C) (Oral)   Resp (!) 31   Ht 5' 3\" (1.6 m)   Wt 158 lb (71.7 kg)   SpO2 98%   BMI 27.99 kg/m²   Physical Exam:   Physical Exam  Vitals and nursing note reviewed. Constitutional:       General: She is awake. She is not in acute distress. Appearance: Normal appearance. She is overweight. She is not ill-appearing, toxic-appearing or diaphoretic. Interventions: She is not intubated. HENT:      Head: Atraumatic. Right Ear: External ear normal.      Left Ear: External ear normal.      Nose: Nose normal. No rhinorrhea. Mouth/Throat:      Mouth: Mucous membranes are moist.      Tongue: Tongue does not deviate from midline. Pharynx: Uvula midline. Eyes:      General: No scleral icterus. Extraocular Movements: Extraocular movements intact. Conjunctiva/sclera: Conjunctivae normal.      Pupils: Pupils are equal, round, and reactive to light. Cardiovascular:      Rate and Rhythm: Normal rate and regular rhythm. Pulses: Normal pulses. Heart sounds: Normal heart sounds. No murmur heard. No gallop. Pulmonary:      Effort: Pulmonary effort is normal. No tachypnea, accessory muscle usage, prolonged expiration or respiratory distress. She is not intubated. Breath sounds: Normal breath sounds. No wheezing, rhonchi or rales. Abdominal:      General: Bowel sounds are normal. There is no distension. Palpations: Abdomen is soft. Tenderness: There is no abdominal tenderness. There is no guarding or rebound. Negative signs include Mckeon's sign and Rovsing's sign. Musculoskeletal:         General: Normal range of motion. Cervical back: Neck supple. Right lower leg: No edema. Left lower leg: No edema. Skin:     General: Skin is warm and dry. Capillary Refill: Capillary refill takes less than 2 seconds. Neurological:      General: No focal deficit present. Mental Status: She is alert and oriented to person, place, and time. Mental status is at baseline. Cranial Nerves: Cranial nerves are intact. No cranial nerve deficit, dysarthria or facial asymmetry. Sensory: Sensation is intact. No sensory deficit. Motor: Motor function is intact. No weakness, tremor, seizure activity or pronator drift. Coordination: Coordination is intact. Finger-Nose-Finger Test normal.   Psychiatric:         Attention and Perception: She is attentive. Mood and Affect: Mood is not anxious. Speech: She is communicative. Speech is not slurred. Behavior: Behavior is cooperative. Past Medical History:      Past Medical History:   Diagnosis Date    Allergic rhinitis     Elevated alkaline phosphatase level     H/O echocardiogram 13-EF >55% Borderline MVP, Trace MR.     Hip pain     Hyperlipidemia     Left hand pain     injury w hand stuck in an auger  early May 2012    Menopause     Migraine headache     Mitral valve prolapse     mild on echo 13    Posterior vitreous detachment     R eye, Dr Yeny Armstrong S/P total hysterectomy and BSO (bilateral salpingo-oophorectomy)      PSHX:  has a past surgical history that includes Hysterectomy (); Nose surgery ();  section (); Tubal ligation (); Hand surgery (); Tonsillectomy (); Cholecystectomy, laparoscopic (); and other surgical history (). Allergies: Allergies   Allergen Reactions    Codeine Nausea And Vomiting    Crestor [Rosuvastatin Calcium]      myalgias    Lipitor      Elevated alk phos       FAM HX: family history includes Elevated Lipids in her mother; Heart Disease in her father; Hypertension in her mother.   Soc HX:   Social History     Socioeconomic History    Marital status:      Spouse name: None    Number of children: None    Years of education: None    Highest education level: None   Occupational History    Occupation: prior     Comment: [employer]    Occupation: Medical professional resources     Comment: medical billing   Tobacco Use    Smoking status: Current Every Day Smoker     Packs/day: 0.25     Types: Cigarettes    Smokeless tobacco: Never Used   Substance and Sexual Activity    Alcohol use: Not Currently     Comment: very Infrequent    Drug use: Never    Sexual activity: None   Other Topics Concern    None   Social History Narrative    None     Social Determinants of Health     Financial Resource Strain:     Difficulty of Paying Living Expenses:    Food Insecurity:     Worried About Running Out of Food in the Last Year:     Ran Out of Food in the Last Year:    Transportation Needs:     Lack of Transportation (Medical):      Lack of Transportation (Non-Medical):    Physical Activity:     Days of Exercise per Week:     Minutes of Exercise per Session:    Stress:     Feeling of Stress :    Social Connections:     Frequency of Communication with Friends and Family:     Frequency of Social Gatherings with Friends and Family:     Attends Zoroastrianism Services:     Active Member of Clubs or Organizations:     Attends Club or Organization Meetings:     Marital Status:    Intimate Partner Violence:     Fear of Current or Ex-Partner:     Emotionally Abused:     Physically Abused:     Sexually Abused:        Data:   CBC with Differential:    Lab Results   Component Value Date    WBC 7.1 10/21/2021    RBC 4.77 10/21/2021    HGB 15.0 10/21/2021    HCT 46.0 10/21/2021     10/21/2021    MCV 96.4 10/21/2021    MCH 31.4 10/21/2021    MCHC 32.6 10/21/2021    RDW 12.5 10/21/2021    SEGSPCT 63.8 10/21/2021    LYMPHOPCT 23.4 10/21/2021    MONOPCT 10.7 10/21/2021    BASOPCT 0.7 10/21/2021    MONOSABS 0.8 10/21/2021    LYMPHSABS 1.7 10/21/2021    EOSABS 0.0 10/21/2021    BASOSABS 0.1 10/21/2021    DIFFTYPE AUTOMATED DIFFERENTIAL 10/21/2021       CMP:     Lab Results   Component Value Date     10/21/2021    K 3.8 10/21/2021     10/21/2021    CO2 25 10/21/2021    BUN 15 10/21/2021    CREATININE 0.6 10/21/2021    GFRAA >60 10/21/2021    AGRATIO 1.7 12/16/2016    LABGLOM >60 10/21/2021    LABGLOM 81 12/16/2016    GLUCOSE 125 10/21/2021    PROT 6.9 10/21/2021    LABALBU 4.5 10/21/2021    CALCIUM 10.1 10/21/2021    BILITOT 0.7 10/21/2021    ALKPHOS 155 10/21/2021    AST 22 10/21/2021    ALT 29 10/21/2021       Troponin:  Lab Results   Component Value Date    TROPONINT <0.010 10/21/2021     Radiology results:  CT HEAD WO CONTRAST   Final Result   1. No acute intracranial abnormality. XR CHEST (2 VW)   Final Result   Likely right middle lobe infiltrate consistent with pneumonia. No active   pleural disease. Medications:   Home Medications:   Prior to Admission medications    Not on File     Medications:    carvedilol  12.5 mg Oral BID WC    lisinopril  5 mg Oral Daily      Infusions:   PRN Meds:      Electronically signed by Sintia Palmer DO on 10/21/2021 at 9:47 PM      This dictation was created with voice recognition software. While attempts have been made to review the dictation as it is transcribed, on occasion the spoken word can be misinterpreted by the technology leading to omissions or inappropriate words, phrases or sentences.

## 2021-10-22 NOTE — CARE COORDINATION
CM into see pt and pt is not in room. Per chart, pt has a PCP. Pt has insurance and able to obtain prescriptions. Pt from home.  [address]

## 2021-10-22 NOTE — PROGRESS NOTES
Daily Progress Note     Pt. Awake, alert and feeling ok  HR stable, NSR in the mid 60's on exam, BP stable  No CP, SOB today  Ok to d/c home  Keep on coreg   Stress test negative for ischemia normal EF  Ct head negative  Headache due to elevated BP  Close follow up for BP management  Will hold off on Statin due to hx of liver  Injury in past with statins   F/u in office next week  She has good functional capacity on stress test-no ekg changes     ST depression    Significant ST depression noted on inferolateral leads at the office    Had been very dizzy and HA    Sent to ER to make sure no UA or NSTEMI    Trop neg. Echo and stress done today and reassuring overall    Believe it was all BP induced    HTN    On coreg now    Stopped ACE as BP started getting low    Keep current meds    LDL noted- does not tolerate statins-attempt lifestyle adjustment first, also need to stop smoking  Stable from cardiac standpoint-ok to D/C today  She will come to office on Monday for repeat EKG to ensure it is stable    PAST MEDICAL HISTORY:  She has no history of stroke. No history of  seizures. No history of MI. She does have new onset of hypertension  present. She has had elevated LFTs secondary to statin and I think that  she stopped taking that and it has improved. She had a stress test done  in 2013 and the stress test was negative for ischemia. It was a  treadmill stress test and for 9 minutes, she walked on the treadmill. No EKG changes were noted.     PAST SURGICAL HISTORY:  She had a gallbladder surgery and hysterectomy.     SOCIAL HISTORY:  She smokes.     ALLERGIES:  CRESTOR, CALCIUM, and LIPITOR.     MEDICATIONS:  She is on Imitrex.   Objective:   /68   Pulse 55   Temp 97.5 °F (36.4 °C) (Oral)   Resp 18   Ht 5' 3\" (1.6 m)   Wt 154 lb 12.2 oz (70.2 kg)   SpO2 97%   BMI 27.42 kg/m²   No intake or output data in the 24 hours ending 10/22/21 1106    Medications:   Scheduled Meds:   carvedilol  12.5 mg Oral BID WC    sodium chloride flush  5-40 mL IntraVENous 2 times per day    aspirin  81 mg Oral Daily    enoxaparin  40 mg SubCUTAneous Daily      Infusions:   sodium chloride        PRN Meds:  sodium chloride flush, sodium chloride, ondansetron **OR** ondansetron, acetaminophen **OR** acetaminophen, polyethylene glycol, nitroGLYCERIN, labetalol       Physical Exam:  Vitals:    10/22/21 0727   BP: 111/68   Pulse: 55   Resp: 18   Temp: 97.5 °F (36.4 °C)   SpO2: 97%        General: AAO, NAD  Chest: Nontender  Cardiac: First and Second Heart Sounds are Normal, No Murmurs or Gallops noted  Lungs:Clear to auscultation and percussion. Abdomen: Soft, NT, ND, +BS  Extremities: No clubbing, no edema  Vascular:  Equal 2+ peripheral pulses. Lab Data:  CBC:   Recent Labs     10/21/21  1614 10/22/21  0241   WBC 7.1 7.4   HGB 15.0 13.7   HCT 46.0 42.2   MCV 96.4 96.6    284     BMP:   Recent Labs     10/21/21  1614      K 3.8      CO2 25   BUN 15   CREATININE 0.6     LIVER PROFILE:   Recent Labs     10/21/21  1614   AST 22   ALT 29   BILIDIR 0.2   BILITOT 0.7   ALKPHOS 155*     PT/INR:   Recent Labs     10/21/21  1614   PROTIME 10.6*   INR 0.82     APTT:   Recent Labs     10/21/21  1614   APTT 31.0     BNP:  No results for input(s): BNP in the last 72 hours.       Assessment:  Patient Active Problem List    Diagnosis Date Noted    EKG abnormalities 10/21/2021    S/P total hysterectomy and BSO (bilateral salpingo-oophorectomy)     Migraine headache     Allergic rhinitis     Hyperlipidemia     Mitral valve prolapse        Electronically signed by Pravin Landers PA-C on 10/22/2021 at 11:06 AM    Electronically signed by Drea Tan MD on 10/22/21 at 11:12 AM EDT

## 2021-10-22 NOTE — PLAN OF CARE
Problem: Falls - Risk of:  Goal: Will remain free from falls  Description: Will remain free from falls  Outcome: Met This Shift  Goal: Absence of physical injury  Description: Absence of physical injury  Outcome: Met This Shift     Problem: Infection:  Goal: Will remain free from infection  Description: Will remain free from infection  10/22/2021 1054 by Monae Luke RN  Outcome: Ongoing  10/22/2021 0049 by Maurice Abreu RN  Outcome: Ongoing     Problem: Safety:  Goal: Free from accidental physical injury  Description: Free from accidental physical injury  10/22/2021 1054 by Monae Luke RN  Outcome: Ongoing  10/22/2021 0049 by Maurice Abreu RN  Outcome: Ongoing  Goal: Free from intentional harm  Description: Free from intentional harm  10/22/2021 1054 by Monae Luke RN  Outcome: Ongoing  10/22/2021 0049 by Maurice Abreu RN  Outcome: Ongoing     Problem: Daily Care:  Goal: Daily care needs are met  Description: Daily care needs are met  10/22/2021 1054 by Monae Luke RN  Outcome: Ongoing  10/22/2021 0049 by Maurice Abreu RN  Outcome: Ongoing     Problem: Pain:  Goal: Patient's pain/discomfort is manageable  Description: Patient's pain/discomfort is manageable  10/22/2021 1054 by Monae Luke RN  Outcome: Ongoing  10/22/2021 0049 by Maurice Abreu RN  Outcome: Ongoing     Problem: Skin Integrity:  Goal: Skin integrity will stabilize  Description: Skin integrity will stabilize  10/22/2021 1054 by Monae Luke RN  Outcome: Ongoing  10/22/2021 0049 by Maurice Abreu RN  Outcome: Ongoing     Problem: Discharge Planning:  Goal: Patients continuum of care needs are met  Description: Patients continuum of care needs are met  10/22/2021 1054 by Monae Luke RN  Outcome: Ongoing  10/22/2021 0049 by Maurice Abreu RN  Outcome: Ongoing

## 2021-10-22 NOTE — ED NOTES
Report given to [facility] and care transferred at this time     Jessica Jacobson, RN  10/21/21 2010

## 2021-10-22 NOTE — PROGRESS NOTES
Reviewed discharge instructions with patient. Discussed reasons to return, follow-up care, care for diagnosis at home, and medications. Pt verbalized understanding and stated no further questions. Pt AOx4 at discharge. Pt had no LDA at discharge. Pt had clothes, shoes, and jacket and cell phone at discharge. Pt brought to ride in wheelchair by volunteer services. Pt given 2 written prescriptions.

## 2021-10-22 NOTE — ED PROVIDER NOTES
Emergency [address] EMERGENCY DEPARTMENT    Patient: Marco A Moura  MRN: [id number]  : 1955  Date of Evaluation: 10/21/2021  ED Provider: Cheko Ballesteros PA-C    Chief Complaint       Chief Complaint   Patient presents with    Other     [facility] [place] [address] [facility] to see Paige Tilley; states she will need heart cath        Marie Mohan is a 61 y.o. female who presents to the emergency department for feeling generally unwell for the last 3 days. Patient reports fatigue, a headache, and palpitations. She states yesterday she had a room-spinning sensation all afternoon, even with her eyes closed, but it was resolved when she woke up this morning. Today she was at work and her supervisor took her next door to Dr. Kaitlin Pete office where they checked her BP and did an EKG. She was noted to have an abnormal EKG, so she was sent here for evaluation. Dr. Jose Conteh saw her in triage, he is recommending admission for echo and possible heart cath. Patient denies any fever, chills, nasal congestion, cough, cp, sob. Denies n/v/d. She has a history of HLD but does not take any medications. + smoker. + family history of CAD. ROS     CONSTITUTIONAL:  Denies fever. + fatigue. EYES:  Denies visual changes. HEAD:  + headache. ENT:  Denies earache, nasal congestion, sore throat. NECK:  Denies neck pain. RESPIRATORY:  Denies any shortness of breath. CARDIOVASCULAR:  Denies chest pain. + palpitations. GI:  Denies nausea or vomiting. :  Denies urinary symptoms. MUSCULOSKELETAL:  Denies extremity pain or swelling. BACK:  Denies back pain. INTEGUMENT:  Denies skin changes. LYMPHATIC:  Denies lymphadenopathy. NEUROLOGIC:  Denies any numbness/tingling. PSYCHIATRIC:  Denies SI/HI.     Past History     Past Medical History:   Diagnosis Date    Allergic rhinitis     Elevated alkaline phosphatase level     H/O echocardiogram 13-EF >55% Borderline MVP, Trace MR.     Hip pain     Hyperlipidemia     Left hand pain     injury w hand stuck in an auger  early May 2012    Menopause     Migraine headache     Mitral valve prolapse     mild on echo 13    Posterior vitreous detachment     R eye, Dr Magui Roque S/P total hysterectomy and BSO (bilateral salpingo-oophorectomy)      Past Surgical History:   Procedure Laterality Date    [address], LAPAR[address]COPIC      Dr. Kim Lynch    right for dislocation   [address]    OTHER SURGICAL HISTORY      biliary stent Dr. Nereida Vargas- removed the following month   [facility]     Social History     Socioeconomic History    Marital status:      Spouse name: None    Number of children: None    Years of education: None    Highest education level: None   Occupational History    Occupation: prior     Comment: [employer]    Occupation: Medical professional resources     Comment: medical billing   Tobacco Use    Smoking status: Former Smoker     Packs/day: 0.50     Quit date: 2009     Years since quittin.8    Smokeless tobacco: Never Used   Substance and Sexual Activity    Alcohol use: Yes     Comment: very Infrequent    Drug use: None    Sexual activity: None   Other Topics Concern    None   Social History Narrative    None     Social Determinants of Health     Financial Resource Strain:     Difficulty of Paying Living Expenses:    Food Insecurity:     Worried About Running Out of Food in the Last Year:     Ran Out of Food in the Last Year:    Transportation Needs:     Lack of Transportation (Medical):      Lack of Transportation (Non-Medical):    Physical Activity:     Days of Exercise per Week:     Minutes of Exercise per Session:    Stress:     Feeling of Stress :    Social Connections:     Frequency of Communication with Friends and Family:     Frequency of Social Gatherings with Friends and Family:     Attends Voodoo Services:     Active Member of Clubs or Organizations:     Attends Club or Organization Meetings:     Marital Status:    Intimate Partner Violence:     Fear of Current or Ex-Partner:     Emotionally Abused:     Physically Abused:     Sexually Abused:        Medications/Allergies     Previous Medications    No medications on file     Allergies   Allergen Reactions    Codeine Nausea And Vomiting    Crestor [Rosuvastatin Calcium]      myalgias    Lipitor      Elevated alk phos        Physical Exam       ED Triage Vitals   BP Temp Temp Source Pulse Resp SpO2 Height Weight   10/21/21 1547 10/21/21 1547 10/21/21 1547 10/21/21 1547 10/21/21 1547 10/21/21 1547 10/21/21 1937 10/21/21 1937   (!) 175/101 98.4 °F (36.9 °C) Oral 73 17 99 % 5' 3\" (1.6 m) 158 lb (71.7 kg)     GENERAL APPEARANCE:  Well-developed, well-nourished, no acute distress. HEAD:  NC/AT. EYES:  Sclera anicteric. ENT:  Ears, nose, mouth normal.     NECK:  Supple. CARDIO:  RRR. LUNGS:   CTAB. Respirations unlabored. ABDOMEN:  Soft, non-distended, non-tender. BS active. EXTREMITIES:  No acute deformities. SKIN:  Warm and dry. NEUROLOGICAL:  Alert and oriented. PSYCHIATRIC:  Normal mood.      Diagnostics     Labs:  Results for orders placed or performed during the hospital encounter of 10/21/21   CBC Auto Differential   Result Value Ref Range    WBC 7.1 4.0 - 10.5 K/CU MM    RBC 4.77 4.2 - 5.4 M/CU MM    Hemoglobin 15.0 12.5 - 16.0 GM/DL    Hematocrit 46.0 37 - 47 %    MCV 96.4 78 - 100 FL    MCH 31.4 (H) 27 - 31 PG    MCHC 32.6 32.0 - 36.0 %    RDW 12.5 11.7 - 14.9 %    Platelets 151 309 - 723 K/CU MM    MPV 9.3 7.5 - 11.1 FL    Differential Type AUTOMATED DIFFERENTIAL     Segs Relative 63.8 36 - 66 %    Lymphocytes % 23.4 (L) 24 - 44 %    Monocytes % 10.7 (H) 0 - 4 %    Eosinophils % 0.6 0 - 3 %    Basophils % 0.7 0 - 1 %    Segs Absolute 4.6 K/CU MM    Lymphocytes Absolute 1.7 K/CU MM    Monocytes Absolute 0.8 K/CU MM    Eosinophils Absolute 0.0 K/CU MM    Basophils Absolute 0.1 K/CU MM    Nucleated RBC % 0.0 %    Total Nucleated RBC 0.0 K/CU MM    Total Immature Neutrophil 0.06 K/CU MM    Immature Neutrophil % 0.8 (H) 0 - 0.43 %   Basic Metabolic Panel w/ Reflex to MG   Result Value Ref Range    Sodium 139 135 - 145 MMOL/L    Potassium 3.8 3.5 - 5.1 MMOL/L    Chloride 104 99 - 110 mMol/L    CO2 25 21 - 32 MMOL/L    Anion Gap 10 4 - 16    BUN 15 6 - 23 MG/DL    CREATININE 0.6 0.6 - 1.1 MG/DL    Glucose 125 (H) 70 - 99 MG/DL    Calcium 10.1 8.3 - 10.6 MG/DL    GFR Non-African American >60 >60 mL/min/1.73m2    GFR African American >60 >60 mL/min/1.73m2   Troponin   Result Value Ref Range    Troponin T <0.010 <0.01 NG/ML   Brain Natriuretic Peptide   Result Value Ref Range    Pro-BNP 55.34 <300 PG/ML   Protime/INR & PTT   Result Value Ref Range    Protime 10.6 (L) 11.7 - 14.5 SECONDS    INR 0.82 INDEX    aPTT 31.0 25.1 - 37.1 SECONDS   TSH without Reflex   Result Value Ref Range    TSH, High Sensitivity 1.040 0.270 - 4.20 uIu/ml   T4, free   Result Value Ref Range    T4 Free 1.17 0.9 - 1.8 NG/DL   Hepatic function panel   Result Value Ref Range    Albumin 4.5 3.4 - 5.0 GM/DL    Total Bilirubin 0.7 0.0 - 1.0 MG/DL    Bilirubin, Direct 0.2 0.0 - 0.3 MG/DL    Bilirubin, Indirect 0.5 0 - 0.7 MG/DL    Alkaline Phosphatase 155 (H) 40 - 129 IU/L    AST 22 15 - 37 IU/L    ALT 29 10 - 40 U/L    Total Protein 6.9 6.4 - 8.2 GM/DL   EKG 12 Lead   Result Value Ref Range    Ventricular Rate 65 BPM    Atrial Rate 65 BPM    P-R Interval 168 ms    QRS Duration 86 ms    Q-T Interval 398 ms    QTc Calculation (Bazett) 413 ms    P Axis 66 degrees    R Axis 46 degrees    T Axis 70 degrees    Diagnosis       Normal sinus rhythm  Possible Left atrial enlargement  Nonspecific ST abnormality  Abnormal ECG  No previous ECGs available Radiographs:  XR CHEST (2 VW)    Result Date: 10/21/2021  EXAMINATION: 2 XRAY VIEWS OF THE CHEST 10/21/2021 3:02 pm COMPARISON: 7/29/2018 HISTORY: ORDERING SYSTEM PROVIDED HISTORY: CP TECHNOLOGIST PROVIDED HISTORY: Reason for exam:->CP Reason for Exam: cp FINDINGS: Suggestion of an infiltrate in the right lung in the right perihilar area inferiorly which on the lateral view appears in the right middle lobe likely. This may represent a pneumonic infiltrate. Heart, mediastinum and pleural surfaces appear stable. Likely right middle lobe infiltrate consistent with pneumonia. No active pleural disease. CT HEAD WO CONTRAST    Result Date: 10/21/2021  EXAMINATION: CT OF THE HEAD WITHOUT CONTRAST  10/21/2021 3:57 pm TECHNIQUE: CT of the head was performed without the administration of intravenous contrast. Dose modulation, iterative reconstruction, and/or weight based adjustment of the mA/kV was utilized to reduce the radiation dose to as low as reasonably achievable. COMPARISON: None. HISTORY: ORDERING SYSTEM PROVIDED HISTORY: dizzy and lightheaded TECHNOLOGIST PROVIDED HISTORY: Has a \"code stroke\" or \"stroke alert\" been called? ->No Reason for exam:->dizzy and lightheaded Decision Support Exception - unselect if not a suspected or confirmed emergency medical condition->Emergency Medical Condition (MA) Reason for Exam: DIZZINESS FINDINGS: BRAIN/VENTRICLES: The cerebral and cerebellar parenchyma demonstrate volume loss. Scattered low-attenuation areas are noted in the periventricular white matter, compatible with chronic microvascular white matter ischemic disease. There is asymmetric enlargement of the posterior right lateral ventricle which may be a congenital abnormality. No mass, hemorrhage, or midline shift. Gray-white differentiation is maintained without evidence of an acute infarct. ORBITS: Lens implants from prior cataract surgery are noted. The orbits are otherwise unremarkable.  SINUSES: There is a right fely bullosa. There is scattered trace paranasal sinus disease. The mastoid air cells are clear. SOFT TISSUES/SKULL:  The calvarium is intact. No appreciable scalp soft tissue swelling. 1. No acute intracranial abnormality. Procedures/EKG:   Please see attending physician's note for interpretation. ED Course and MDM   -  Patient seen and evaluated in the emergency department. -  Triage and nursing notes reviewed and incorporated. -  Old chart records reviewed and incorporated. -  Supervising physician was Dr. Ambar Street.  Patient was seen independently. -  Work-up included:  See above  -  ED medications:  Coreg, Lisinopril  -  Results discussed with patient. CT head is non-acute. CXR with possible right sided infiltrate--she denies cough, SOB. Procalcitonin is not elevated. COVID is negative. She is not vaccinated for COVID. Will hold on treatment. Dr. Rashida Soto with plan for echocardiogram in the morning and further evaluation as needed. I spoke with Dr. Slava Resendez, hospitalist, who will see and admit. In light of current events, I did utilize appropriate PPE (including N95 and surgical face mask, safety glasses, and gloves, as recommended by the health facility/national standard best practice, during my bedside interactions with the patient. Final Impression      1. Other fatigue    2. Nonintractable headache, unspecified chronicity pattern, unspecified headache type    3. Palpitations    4. Pneumonia of right middle lobe due to infectious organism    5.  Elevated blood pressure reading            Zhanna 25, PA-C  [address], [place]  10/21/21 6910

## 2021-10-22 NOTE — DISCHARGE SUMMARY
Discharge Summary    Name:  Mark Chawla /Age/Sex: 1955  (61 y.o. female)   MRN & CSN:  [id number] & [id number] Admission Date/Time: 10/21/2021  7:30 PM   Attending:  Radha Gomez DO Discharging Physician: MAIN Boateng - CNP     HPI and Hospital Course:     Mark Chawla is a 61 y.o.  female with PMH migraines and persistent alkaline phosphate presented to [facility] on 10/21/2021 from cardiologist office for abnormal EKG. She was hospitalized for further work-up/treatment and cardiology evaluation. Hospital course as follows; per H&P patient has been complaining of some fatigue, headaches, dizziness and high blood pressure. She was seen by her cardiologist on day of arrival who noted blood pressure was elevated at 168/94 and with an abnormal EKG so she was advised to go to the ED. ACS ruled out with negative serial troponin. ER reported EKG with NSR and nonspecific ST abnormalities. Discussed with cardiology on day of discharge who noted normal echocardiogram and stress test. Okay to discharge home on ASA and carvedilol. On day discharge patient was back to baseline, felt well and wanted to go home. Denied chest pain, no shortness of breath. Of note CXR was concerning for possible PNA however clinically did not suspect pneumonia. Afebrile, no elevated WBC. Procalcitonin normal.  Adequately oxygenating on room air. No indication for ATB.  however not initiated on statin as she has not tolerated in the past.  Cardiology will follow up with her outpatient. Plan for follow-up with cardiology on Monday, 10/25/2021 for repeat EKG. Recommend routine follow-up with PCP as well      The patient expressed appropriate understanding of and agreement with the discharge recommendations, medications, and plan.      Consults this admission:  IP CONSULT TO HOSPITALIST    Discharge Instruction:   Follow up appointments: PCP  Primary care physician:  within 2 weeks    Diet: General/cardiac/ADA/as tolerated  Activity: {discharge activity: as tolerated  Disposition: Discharged to:   [x]Home, []HHC, []SNF, []Acute Rehab, []Hospice   Condition on discharge: Stable    Discharge Medications:    Royal Kumar   Home Medication Instructions IOA:[id number]    Printed on:10/22/21 1121   Medication Information                      aspirin 81 MG chewable tablet  Take 1 tablet by mouth daily             carvedilol (COREG) 12.5 MG tablet  Take 1 tablet by mouth 2 times daily (with meals)                 Objective Findings at Discharge:   /68   Pulse 55   Temp 97.5 °F (36.4 °C) (Oral)   Resp 18   Ht 5' 3\" (1.6 m)   Wt 154 lb 12.2 oz (70.2 kg)   SpO2 97%   BMI 27.42 kg/m²            General: 64-year female who appears stated age and in no acute distress  Eyes: eyelids/lashes normal appearence. Pupils equal, round and reactive. No scleral erythema, discharge, or conjunctivitis. HENT: bilateral ear and nose normal in appearance. Mucous membranes moist. Hearing grossly intact. Neck supple, no apparent thyromegaly or masses. Respiratory: lungs lear to auscultation, no wheezes, rales or rhonchi. Symmetric chest movement while on room air. Cardiovascular: RRR, S1/S2 auscultated. No murmurs, rubs, or gallops. No JVD or carotid bruits. Peripheral pulses equal bilaterally and palpable. No peripheral edema. GI: abdomen soft. No tenderness, masses, or guarding. Bowel sounds are normoactive. Rectal exam deferred. : No costovertebral angle tenderness. Faust catheter is not present. Heme/lymph: no palpable cervical lymphadenopathy and no hepatosplenomegaly. No petechiae or ecchymoses. Musculoskeletal: Full ROM of all 4 extremities, strength 5/5 in all extremities. No gross joint deformities. Skin: intact, no rashes or lesions. Neurological: cranial nerves 2-12 grossly intact. No slurred speech, no facial droop. Non-focal   Psych: LOC X4, calm and cooperative.  Affect appropriate.     BMP/CBC  Recent Labs     10/21/21  1614 10/22/21  0241     --    K 3.8  --      --    CO2 25  --    BUN 15  --    CREATININE 0.6  --    WBC 7.1 7.4   HCT 46.0 42.2    284     SIGNIFICANT IMAGING AND LABS:      Discharge Time of less than 30 minutes    Electronically signed by MAIN Schmidt CNP on 10/22/2021 at 11:21 AM

## 2021-10-25 ENCOUNTER — OFFICE VISIT (OUTPATIENT)
Dept: INTERNAL MEDICINE CLINIC | Age: 66
End: 2021-10-25

## 2021-10-25 VITALS
SYSTOLIC BLOOD PRESSURE: 127 MMHG | OXYGEN SATURATION: 97 % | HEART RATE: 60 BPM | DIASTOLIC BLOOD PRESSURE: 60 MMHG | WEIGHT: 157 LBS | BODY MASS INDEX: 27.81 KG/M2 | RESPIRATION RATE: 16 BRPM

## 2021-10-25 DIAGNOSIS — R91.8 RIGHT MIDDLE LOBE PULMONARY INFILTRATE: Primary | ICD-10-CM

## 2021-10-25 DIAGNOSIS — E78.2 HYPERLIPEMIA, MIXED: ICD-10-CM

## 2021-10-25 DIAGNOSIS — I10 PRIMARY HYPERTENSION: ICD-10-CM

## 2021-10-25 PROCEDURE — 99213 OFFICE O/P EST LOW 20 MIN: CPT | Performed by: INTERNAL MEDICINE

## 2021-10-25 RX ORDER — CARVEDILOL 12.5 MG/1
12.5 TABLET ORAL 2 TIMES DAILY WITH MEALS
Qty: 60 TABLET | Refills: 2 | Status: SHIPPED | OUTPATIENT
Start: 2021-10-25 | End: 2022-01-25 | Stop reason: SDUPTHER

## 2021-10-25 ASSESSMENT — PATIENT HEALTH QUESTIONNAIRE - PHQ9
SUM OF ALL RESPONSES TO PHQ QUESTIONS 1-9: 0
SUM OF ALL RESPONSES TO PHQ QUESTIONS 1-9: 0
1. LITTLE INTEREST OR PLEASURE IN DOING THINGS: 0
DEPRESSION UNABLE TO ASSESS: FUNCTIONAL CAPACITY MOTIVATION LIMITS ACCURACY
SUM OF ALL RESPONSES TO PHQ QUESTIONS 1-9: 0
2. FEELING DOWN, DEPRESSED OR HOPELESS: 0
SUM OF ALL RESPONSES TO PHQ9 QUESTIONS 1 & 2: 0

## 2021-10-25 NOTE — PROGRESS NOTES
Luisa Hooker  1955  10/25/21    SUBJECTIVE:    Seen for hospital f/u, had presented to Dr Monisha Sanches office for headache and palpitations, had elevated bp 160s/90s, transferred to hospital and admitted. Started bp meds lisinopril and coreg, ruled out for ischemia or MI, echo and stress ok, EKG w sinus arrhythmia. Not had any sx cp or sob. Later bp meds stepped down to coreg alone. However, cxr did suggest ?possible pneumonia? Still smoking but trying to quit. No cough or sob  Chol high too but intol of statins in the past.    No ins at this time, but will retire and be on medicare in Jan.      OBJECTIVE:    /60   Pulse 60   Resp 16   Wt 157 lb (71.2 kg)   SpO2 97%   BMI 27.81 kg/m²     Physical Exam  Vitals reviewed. Constitutional:       Appearance: She is well-developed. Cardiovascular:      Rate and Rhythm: Normal rate and regular rhythm. Heart sounds: Normal heart sounds. No murmur heard. No friction rub. No gallop. Pulmonary:      Effort: Pulmonary effort is normal. No respiratory distress. Breath sounds: Normal breath sounds. No wheezing or rales. Abdominal:      General: Bowel sounds are normal. There is no distension. Palpations: Abdomen is soft. Tenderness: There is no abdominal tenderness. Musculoskeletal:      Cervical back: Neck supple. Right lower leg: No edema. Left lower leg: No edema. Neurological:      Mental Status: She is alert. ASSESSMENT:    1. Right middle lobe pulmonary infiltrate    2. Primary hypertension    3. Hyperlipemia, mixed        PLAN:    Luz was seen today for follow-up from hospital, headache and hypertension. Diagnoses and all orders for this visit:    Right middle lobe pulmonary infiltrate- no sx at this time. We'll recheck cxr and asked her to work aggressively on smoking cessation  -     XR CHEST STANDARD (2 VW);  Future    Primary hypertension - Blood pressure stable and will continue current regimen. Will plan periodic monitoring of renal function, electrolytes, lipid profile. -     carvedilol (COREG) 12.5 MG tablet; Take 1 tablet by mouth 2 times daily (with meals)  -     Comprehensive Metabolic Panel; Future  -     CBC Auto Differential; Future  -     Lipid Panel; Future  -     Urinalysis with Microscopic; Future    Hyperlipemia, mixed-  She will try ryr then f/u lab prior to next appt  -     Comprehensive Metabolic Panel; Future  -     Lipid Panel; Future  -     TSH without Reflex;  Future

## 2021-10-25 NOTE — PATIENT INSTRUCTIONS
For your follow up cxr, do this 2-3d prior to next appt in Jan.    If any worsening cough or congestion, sob, fever or chills check this sooner. 3 weeks prior to Jan appt also start otc red yeast rice 600mg daily, continue to work on both a low fat diet and stopping smoking. Lastly try to stop the soda intake, coke.

## 2021-11-05 ENCOUNTER — OFFICE VISIT (OUTPATIENT)
Dept: INTERNAL MEDICINE CLINIC | Age: 66
End: 2021-11-05

## 2021-11-05 VITALS
DIASTOLIC BLOOD PRESSURE: 76 MMHG | RESPIRATION RATE: 16 BRPM | SYSTOLIC BLOOD PRESSURE: 140 MMHG | OXYGEN SATURATION: 98 % | HEART RATE: 68 BPM

## 2021-11-05 DIAGNOSIS — I10 PRIMARY HYPERTENSION: Primary | ICD-10-CM

## 2021-11-05 PROCEDURE — 99213 OFFICE O/P EST LOW 20 MIN: CPT | Performed by: INTERNAL MEDICINE

## 2021-11-05 RX ORDER — LOSARTAN POTASSIUM 25 MG/1
25 TABLET ORAL DAILY
COMMUNITY
End: 2022-01-03 | Stop reason: SDUPTHER

## 2021-11-05 NOTE — PROGRESS NOTES
Luisa Hooker  1955 11/05/21    SUBJECTIVE:  Dr Lazaro Medina added losartan 25mg daily on10/30 for high BP to 130-148. Feels leg cramps, has some ear ringing. Bp still high at home sporadically to 170, but avg since adding losartan range is 116- 148. Recheck L arm was 108/64. OBJECTIVE:    BP (!) 140/76   Pulse 68   Resp 16   SpO2 98%     Physical Exam  Vitals reviewed. Constitutional:       Appearance: She is well-developed. Cardiovascular:      Rate and Rhythm: Normal rate and regular rhythm. Heart sounds: Normal heart sounds. No murmur heard. No friction rub. No gallop. Pulmonary:      Effort: Pulmonary effort is normal. No respiratory distress. Breath sounds: Normal breath sounds. No wheezing or rales. Abdominal:      General: Bowel sounds are normal. There is no distension. Palpations: Abdomen is soft. Tenderness: There is no abdominal tenderness. Musculoskeletal:      Cervical back: Neck supple. Neurological:      Mental Status: She is alert. ASSESSMENT:    1. Primary hypertension        PLAN:    Luz was seen today for hypertension, muscle pain, palpitations and fatigue. Diagnoses and all orders for this visit:    Primary hypertension- BP LABILE BUT SHOULD EVEN OUT MORE AS SHE CONT MEDS. DID ADVISE MORE WORK ALSO ON LOW SALT INTAKE, WT LOSS AND EXERCISE TO IMPROVE BP.  SEE PT INSTRUCTIONS  AND REC LOSARTAN TITRATION IF BP LATER DROPS MORE    also consider - if allowed at work- to try an exercise peddler up to 2hrs/day    Continue watching bp, if systolic bp drops below 942 hold losartan. If systolic bp is <704 take only 1/2 tab, 12.5mg for that day. rec to aim for goal wt 140#. Let us know how your bp is doing in a couple of weeks. Also if you are still having symptoms of leg cramps, ear ringing.

## 2021-11-05 NOTE — PATIENT INSTRUCTIONS
Patient Education        DASH Diet: Care Instructions  Your Care Instructions     The DASH diet is an eating plan that can help lower your blood pressure. DASH stands for Dietary Approaches to Stop Hypertension. Hypertension is high blood pressure. The DASH diet focuses on eating foods that are high in calcium, potassium, and magnesium. These nutrients can lower blood pressure. The foods that are highest in these nutrients are fruits, vegetables, low-fat dairy products, nuts, seeds, and legumes. But taking calcium, potassium, and magnesium supplements instead of eating foods that are high in those nutrients does not have the same effect. The DASH diet also includes whole grains, fish, and poultry. The DASH diet is one of several lifestyle changes your doctor may recommend to lower your high blood pressure. Your doctor may also want you to decrease the amount of sodium in your diet. Lowering sodium while following the DASH diet can lower blood pressure even further than just the DASH diet alone. Follow-up care is a key part of your treatment and safety. Be sure to make and go to all appointments, and call your doctor if you are having problems. It's also a good idea to know your test results and keep a list of the medicines you take. How can you care for yourself at home? Following the DASH diet  · Eat 4 to 5 servings of fruit each day. A serving is 1 medium-sized piece of fruit, ½ cup chopped or canned fruit, 1/4 cup dried fruit, or 4 ounces (½ cup) of fruit juice. Choose fruit more often than fruit juice. · Eat 4 to 5 servings of vegetables each day. A serving is 1 cup of lettuce or raw leafy vegetables, ½ cup of chopped or cooked vegetables, or 4 ounces (½ cup) of vegetable juice. Choose vegetables more often than vegetable juice. · Get 2 to 3 servings of low-fat and fat-free dairy each day. A serving is 8 ounces of milk, 1 cup of yogurt, or 1 ½ ounces of cheese. · Eat 6 to 8 servings of grains each day. A serving is 1 slice of bread, 1 ounce of dry cereal, or ½ cup of cooked rice, pasta, or cooked cereal. Try to choose whole-grain products as much as possible. · Limit lean meat, poultry, and fish to 2 servings each day. A serving is 3 ounces, about the size of a deck of cards. · Eat 4 to 5 servings of nuts, seeds, and legumes (cooked dried beans, lentils, and split peas) each week. A serving is 1/3 cup of nuts, 2 tablespoons of seeds, or ½ cup of cooked beans or peas. · Limit fats and oils to 2 to 3 servings each day. A serving is 1 teaspoon of vegetable oil or 2 tablespoons of salad dressing. · Limit sweets and added sugars to 5 servings or less a week. A serving is 1 tablespoon jelly or jam, ½ cup sorbet, or 1 cup of lemonade. · Eat less than 2,300 milligrams (mg) of sodium a day. If you limit your sodium to 1,500 mg a day, you can lower your blood pressure even more. · Be aware that all of these are the suggested number of servings for people who eat 1,800 to 2,000 calories a day. Your recommended number of servings may be different if you need more or fewer calories. Tips for success  · Start small. Do not try to make dramatic changes to your diet all at once. You might feel that you are missing out on your favorite foods and then be more likely to not follow the plan. Make small changes, and stick with them. Once those changes become habit, add a few more changes. · Try some of the following:  ? Make it a goal to eat a fruit or vegetable at every meal and at snacks. This will make it easy to get the recommended amount of fruits and vegetables each day. ? Try yogurt topped with fruit and nuts for a snack or healthy dessert. ? Add lettuce, tomato, cucumber, and onion to sandwiches. ? Combine a ready-made pizza crust with low-fat mozzarella cheese and lots of vegetable toppings. Try using tomatoes, squash, spinach, broccoli, carrots, cauliflower, and onions. ?  Have a variety of cut-up vegetables with a low-fat dip as an appetizer instead of chips and dip. ? Sprinkle sunflower seeds or chopped almonds over salads. Or try adding chopped walnuts or almonds to cooked vegetables. ? Try some vegetarian meals using beans and peas. Add garbanzo or kidney beans to salads. Make burritos and tacos with mashed gandhi beans or black beans. Where can you learn more? Go to https://DatalotpeFigure 1.Shijiebang. org and sign in to your [employer] account. Enter G884 in the Encap box to learn more about \"DASH Diet: Care Instructions. \"     If you do not have an account, please click on the \"Sign Up Now\" link. Current as of: April 29, 2021               Content Version: 13.0  © 4076-0897 Healthwise, Incorporated. Care instructions adapted under license by [facility] ([facility]). If you have questions about a medical condition or this instruction, always ask your healthcare professional. [place] [address] any warranty or liability for your use of this information. also consider - if allowed at work- to try an exercise peddler up to 2hrs/day    Continue watching bp, if systolic bp drops below 229 hold losartan. If systolic bp is <813 take only 1/2 tab, 12.5mg for that day. rec to aim for goal wt 140#. Let us know how your bp is doing in a couple of weeks. Also if you are still having symptoms of leg cramps, ear ringing.

## 2021-11-17 ENCOUNTER — TELEPHONE (OUTPATIENT)
Dept: INTERNAL MEDICINE CLINIC | Age: 66
End: 2021-11-17

## 2021-11-17 RX ORDER — PREDNISONE 1 MG/1
TABLET ORAL
Qty: 21 TABLET | Refills: 0 | Status: SHIPPED
Start: 2021-11-17 | End: 2021-11-24 | Stop reason: CLARIF

## 2021-11-17 NOTE — TELEPHONE ENCOUNTER
Patient calls she states she tested + for COVID and wanted to know if she can take OTC Advil Cold and Sinus along with her Losartan. Per Dr. Oriana Merino, that would be okay and she can also try Prednisone. I did send the prescription in . Patient notified.

## 2021-11-24 ENCOUNTER — VIRTUAL VISIT (OUTPATIENT)
Dept: INTERNAL MEDICINE CLINIC | Age: 66
End: 2021-11-24

## 2021-11-24 DIAGNOSIS — R05.9 COUGH: Primary | ICD-10-CM

## 2021-11-24 PROCEDURE — 99213 OFFICE O/P EST LOW 20 MIN: CPT | Performed by: NURSE PRACTITIONER

## 2021-11-24 RX ORDER — PREDNISONE 10 MG/1
TABLET ORAL
Qty: 20 TABLET | Refills: 0 | Status: SHIPPED | OUTPATIENT
Start: 2021-11-24 | End: 2022-01-25 | Stop reason: ALTCHOICE

## 2021-11-24 RX ORDER — BENZONATATE 200 MG/1
200 CAPSULE ORAL 3 TIMES DAILY PRN
Qty: 30 CAPSULE | Refills: 0 | Status: SHIPPED | OUTPATIENT
Start: 2021-11-24 | End: 2021-12-01

## 2021-11-24 RX ORDER — ONDANSETRON 4 MG/1
4 TABLET, FILM COATED ORAL 3 TIMES DAILY PRN
Qty: 30 TABLET | Refills: 0 | Status: SHIPPED | OUTPATIENT
Start: 2021-11-24 | End: 2022-01-25 | Stop reason: ALTCHOICE

## 2021-11-24 RX ORDER — AMOXICILLIN AND CLAVULANATE POTASSIUM 875; 125 MG/1; MG/1
1 TABLET, FILM COATED ORAL 2 TIMES DAILY
Qty: 14 TABLET | Refills: 0 | Status: SHIPPED | OUTPATIENT
Start: 2021-11-24 | End: 2021-12-01

## 2021-11-24 ASSESSMENT — ENCOUNTER SYMPTOMS
COUGH: 1
COLOR CHANGE: 0
SHORTNESS OF BREATH: 0
DIARRHEA: 0
SINUS PAIN: 1
CHEST TIGHTNESS: 0
ABDOMINAL PAIN: 0
VOMITING: 0
SINUS PRESSURE: 1
NAUSEA: 1
APNEA: 0

## 2021-11-24 NOTE — PROGRESS NOTES
2021    TELEHEALTH EVALUATION -- Audio/Visual (During AYRKI-35 public health emergency)    HPI:    Kole Xie (:  1955) has requested an audio/video evaluation for the following concern(s):    Mrs Catarina Hernandez tested positive for COVID x 9 days ago and since then has been doing fairly well, however, in the last 1-2 days has been having increased cough, sinus pain/pressure, fevers of 100-101. Has been taking tylenol and also recent some prednisone prescribed which she now cannot tolerate d/t nausea. Denies any wheezing or shortness of breath, but is having some chest tightness/soress in the right upper chest.     Review of Systems   Constitutional: Negative for activity change, appetite change, fatigue and fever. HENT: Positive for congestion, postnasal drip, sinus pressure and sinus pain. Negative for nosebleeds. Respiratory: Positive for cough. Negative for apnea, chest tightness and shortness of breath. Cardiovascular: Negative for chest pain and palpitations. Gastrointestinal: Positive for nausea. Negative for abdominal pain, diarrhea and vomiting. Genitourinary: Negative for difficulty urinating, flank pain and hematuria. Musculoskeletal: Negative for arthralgias, joint swelling and myalgias. Skin: Negative for color change and rash. Neurological: Negative for dizziness, light-headedness and headaches. Psychiatric/Behavioral: Negative. Negative for behavioral problems. Prior to Visit Medications    Medication Sig Taking?  Authorizing Provider   amoxicillin-clavulanate (AUGMENTIN) 875-125 MG per tablet Take 1 tablet by mouth 2 times daily for 7 days Yes MAIN Iqbal CNP   benzonatate (TESSALON) 200 MG capsule Take 1 capsule by mouth 3 times daily as needed for Cough Yes MAIN Iqbal CNP   predniSONE (DELTASONE) 10 MG tablet Take 4 tablets daily for 2 days, then 3 tablets for 2 days, 2 tablets for 2 days, then 1 tablet for 2 days Yes MAIN Iqbal CNP Every Day Smoker     Packs/day: 0.25     Types: Cigarettes    Smokeless tobacco: Never Used   Substance Use Topics    Alcohol use: Not Currently     Comment: very Infrequent    Drug use: Never       PHYSICAL EXAMINATION:  [ INSTRUCTIONS:  \"[x]\" Indicates a positive item  \"[]\" Indicates a negative item  -- DELETE ALL ITEMS NOT EXAMINED]  Vital Signs: (As obtained by patient/caregiver or practitioner observation)    Blood pressure-  Heart rate-    Respiratory rate-    Temperature-  Pulse oximetry-     Constitutional: [x] Appears well-developed and well-nourished [] No apparent distress      [] Abnormal-   Mental status  [x] Alert and awake  [x] Oriented to person/place/time [x]Able to follow commands      Eyes:  EOM    [x]  Normal  [] Abnormal-  Sclera  [x]  Normal  [] Abnormal -         Discharge [x]  None visible  [] Abnormal -    HENT:   [x] Normocephalic, atraumatic. [] Abnormal   [x] Mouth/Throat: Mucous membranes are moist.     External Ears [x] Normal  [] Abnormal-     Neck: [x] No visualized mass     Pulmonary/Chest: [x] Respiratory effort normal.  [x] No visualized signs of difficulty breathing or respiratory distress        [] Abnormal-      Musculoskeletal:   [x] Normal gait with no signs of ataxia         [x] Normal range of motion of neck        [] Abnormal-       Neurological:        [x] No Facial Asymmetry (Cranial nerve 7 motor function) (limited exam to video visit)          [x] No gaze palsy        [] Abnormal-         Skin:        [x] No significant exanthematous lesions or discoloration noted on facial skin         [] Abnormal-            Psychiatric:       [x] Normal Affect [x] No Hallucinations        [] Abnormal-     Other pertinent observable physical exam findings-     ASSESSMENT/PLAN:  1. Cough- with recent COVID dx, I am concerned about secondary bacterial infection, possibly pneumonia. Unfortunately, she is out of the time frame for antibody infusion.  Start Augementin, extend prednisone taper, PRN tessalon and if no improvement over next 3-4 days, obtain CXR as below. - XR CHEST STANDARD (2 VW); Future  - amoxicillin-clavulanate (AUGMENTIN) 875-125 MG per tablet; Take 1 tablet by mouth 2 times daily for 7 days  Dispense: 14 tablet; Refill: 0  - benzonatate (TESSALON) 200 MG capsule; Take 1 capsule by mouth 3 times daily as needed for Cough  Dispense: 30 capsule; Refill: 0  - predniSONE (DELTASONE) 10 MG tablet; Take 4 tablets daily for 2 days, then 3 tablets for 2 days, 2 tablets for 2 days, then 1 tablet for 2 days  Dispense: 20 tablet; Refill: 0  - ondansetron (ZOFRAN) 4 MG tablet; Take 1 tablet by mouth 3 times daily as needed for Nausea or Vomiting  Dispense: 30 tablet; Refill: 0    Course of treatment, including any medications, possible imaging, referrals, and follow ups discussed with patient. All risks and benefits and possible side effects discussed with patient who agrees to plan of care and verbalizes understanding. All labs and imaging reviewed. Return if symptoms worsen or fail to improve. Kathrine Walkerben, was evaluated through a synchronous (real-time) audio-video encounter. The patient (or guardian if applicable) is aware that this is a billable service. Verbal consent to proceed has been obtained within the past 12 months. The visit was conducted pursuant to the emergency declaration under the [address] [place] [address], [address] [place] [address] and the [employer] General Act. Patient identification was verified, and a caregiver was present when appropriate. The patient was located in a state where the provider was credentialed to provide care. Total time spent on this encounter: 15 minutes    --MAIN Kelly CNP on 11/24/2021 at 12:49 PM    An electronic signature was used to authenticate this note.

## 2022-01-03 RX ORDER — LOSARTAN POTASSIUM 25 MG/1
37.5 TABLET ORAL DAILY
Qty: 135 TABLET | Refills: 1 | Status: SHIPPED | OUTPATIENT
Start: 2022-01-03 | End: 2022-01-25 | Stop reason: SDUPTHER

## 2022-01-25 ENCOUNTER — OFFICE VISIT (OUTPATIENT)
Dept: INTERNAL MEDICINE CLINIC | Age: 67
End: 2022-01-25
Payer: MEDICARE

## 2022-01-25 VITALS
HEART RATE: 68 BPM | DIASTOLIC BLOOD PRESSURE: 74 MMHG | SYSTOLIC BLOOD PRESSURE: 118 MMHG | OXYGEN SATURATION: 98 % | WEIGHT: 156 LBS | BODY MASS INDEX: 27.64 KG/M2 | HEIGHT: 63 IN | RESPIRATION RATE: 14 BRPM

## 2022-01-25 DIAGNOSIS — F17.200 TOBACCO DEPENDENCE: ICD-10-CM

## 2022-01-25 DIAGNOSIS — R05.9 COUGH: ICD-10-CM

## 2022-01-25 DIAGNOSIS — I10 PRIMARY HYPERTENSION: ICD-10-CM

## 2022-01-25 DIAGNOSIS — Z12.12 SCREENING FOR COLORECTAL CANCER: ICD-10-CM

## 2022-01-25 DIAGNOSIS — Z00.00 ROUTINE GENERAL MEDICAL EXAMINATION AT A HEALTH CARE FACILITY: Primary | ICD-10-CM

## 2022-01-25 DIAGNOSIS — Z12.11 SCREENING FOR COLORECTAL CANCER: ICD-10-CM

## 2022-01-25 DIAGNOSIS — Z12.31 VISIT FOR SCREENING MAMMOGRAM: ICD-10-CM

## 2022-01-25 DIAGNOSIS — M81.0 AGE-RELATED OSTEOPOROSIS WITHOUT CURRENT PATHOLOGICAL FRACTURE: ICD-10-CM

## 2022-01-25 DIAGNOSIS — E78.2 MIXED HYPERLIPIDEMIA: ICD-10-CM

## 2022-01-25 PROCEDURE — G0402 INITIAL PREVENTIVE EXAM: HCPCS | Performed by: INTERNAL MEDICINE

## 2022-01-25 PROCEDURE — 3017F COLORECTAL CA SCREEN DOC REV: CPT | Performed by: INTERNAL MEDICINE

## 2022-01-25 PROCEDURE — G8484 FLU IMMUNIZE NO ADMIN: HCPCS | Performed by: INTERNAL MEDICINE

## 2022-01-25 RX ORDER — CARVEDILOL 12.5 MG/1
12.5 TABLET ORAL 2 TIMES DAILY WITH MEALS
Qty: 180 TABLET | Refills: 1 | Status: SHIPPED | OUTPATIENT
Start: 2022-01-25 | End: 2022-07-25 | Stop reason: SDUPTHER

## 2022-01-25 RX ORDER — PREDNISONE 1 MG/1
TABLET ORAL
Qty: 21 TABLET | Refills: 0 | Status: SHIPPED | OUTPATIENT
Start: 2022-01-25 | End: 2022-04-05 | Stop reason: ALTCHOICE

## 2022-01-25 RX ORDER — LOSARTAN POTASSIUM 25 MG/1
37.5 TABLET ORAL DAILY
Qty: 135 TABLET | Refills: 1 | Status: SHIPPED | OUTPATIENT
Start: 2022-01-25 | End: 2022-07-25 | Stop reason: SDUPTHER

## 2022-01-25 RX ORDER — DOXYCYCLINE HYCLATE 100 MG
100 TABLET ORAL 2 TIMES DAILY
Qty: 20 TABLET | Refills: 0 | Status: SHIPPED | OUTPATIENT
Start: 2022-01-25 | End: 2022-02-04

## 2022-01-25 ASSESSMENT — PATIENT HEALTH QUESTIONNAIRE - PHQ9
SUM OF ALL RESPONSES TO PHQ9 QUESTIONS 1 & 2: 0
SUM OF ALL RESPONSES TO PHQ QUESTIONS 1-9: 0
2. FEELING DOWN, DEPRESSED OR HOPELESS: 0
SUM OF ALL RESPONSES TO PHQ QUESTIONS 1-9: 0
1. LITTLE INTEREST OR PLEASURE IN DOING THINGS: 0
SUM OF ALL RESPONSES TO PHQ QUESTIONS 1-9: 0
SUM OF ALL RESPONSES TO PHQ QUESTIONS 1-9: 0

## 2022-01-25 ASSESSMENT — LIFESTYLE VARIABLES: HOW OFTEN DO YOU HAVE A DRINK CONTAINING ALCOHOL: 0

## 2022-01-25 NOTE — PROGRESS NOTES
Medicare Annual Wellness Visit  Name: Emily Andujar Date: 2022   MRN: [id number] Sex: Female   Age: 61 y.o. Ethnicity: Non- / Non    : 1955 Race: White (non-)      Ankit Osuna is here for Medicare AWV and Laryngitis (recent COVID test negative)    Screenings for behavioral, psychosocial and functional/safety risks, and cognitive dysfunction are all negative except as indicated below. These results, as well as other patient data from the [address] form, are documented in Flowsheets linked to this Encounter. Now working part time since .    Has had some recurring cough the past 3d, no fever or chills. Has throat tickle and sinus drainage. No change w advil cold/sinus. Did have covid infection in Nov last yr, later resolved. Remains unvaccinated electively. Did have RML pneumonia on CXR in Oct last yr. Was NOT treated w abx at that time. Continues to smoke and does not want to quit at this time. - about 5cig/day. Does drink coke but down to 2/day. Hypertension: Stable. Denies CP, SOB, visual changes, dizziness, palpitations or HA. Bp now well controlled onmeds, had extensive cardio eval last yr Dr Mikel Polanco    Defers vaccines incl covid, flu, others. Due for scr hep C. Willing to do cologuard    Allergies   Allergen Reactions    Codeine Nausea And Vomiting    Crestor [Rosuvastatin Calcium]      myalgias    Lipitor      Elevated alk phos    Valtrex [Valacyclovir]      Felt drunk, slurred speech       Prior to Visit Medications    Medication Sig Taking?  Authorizing Provider   losartan (COZAAR) 25 MG tablet Take 1.5 tablets by mouth daily Yes Socrates Arteaga MD   carvedilol (COREG) 12.5 MG tablet Take 1 tablet by mouth 2 times daily (with meals) Yes Socrates Arteaga MD   aspirin 81 MG chewable tablet Take 1 tablet by mouth daily Yes MAIN Ferris - CNP       Past Medical History:   Diagnosis Date    Allergic rhinitis  Elevated alkaline phosphatase level     H/O echocardiogram 07/23/2013 7/13-EF >55% Borderline MVP, Trace MR.     Hip pain     Hyperlipidemia     Left hand pain     injury w hand stuck in an auger  early May 2012    Menopause     Migraine headache     Mitral valve prolapse     mild on echo 7/23/13    Posterior vitreous detachment     R eye, Dr Neha Pope    Primary hypertension 10/2021    admitted obs for high bp 10/21, corrected w coreg and w/u benign.  S/P total hysterectomy and BSO (bilateral salpingo-oophorectomy)        Past Surgical History:   Procedure Laterality Date   [address], LAPAROSCOPIC  4/04    Dr. Abbey José    right for dislocation    HYSTERECTOMY  1989    NOSE SURGERY  1990    OTHER SURGICAL HISTORY  4/04    biliary stent Dr. Sharmaine Kincaid- removed the following month   [address]       Family History   Problem Relation Age of Onset    Hypertension Mother     Elevated Lipids Mother     Heart Disease Father         CAD with multiple stents       CareTeam (Including outside providers/suppliers regularly involved in providing care):   Patient Care Team:  Beny Degroot MD as PCP - General  Beny Degroot MD as PCP - [facility] Empaneled Provider    Wt Readings from Last 3 Encounters:   01/25/22 156 lb (70.8 kg)   10/25/21 157 lb (71.2 kg)   10/22/21 154 lb 12.2 oz (70.2 kg)     Vitals:    01/25/22 0756   BP: 118/74   Pulse: 68   Resp: 14   SpO2: 98%   Weight: 156 lb (70.8 kg)   Height: 5' 3\" (1.6 m)     Body mass index is 27.63 kg/m². Based upon direct observation of the patient, evaluation of cognition reveals recent and remote memory intact.     General Appearance: alert and oriented to person, place and time, well developed and well- nourished, in no acute distress  Skin: warm and dry, no rash or erythema  Head: normocephalic and atraumatic  Eyes: pupils equal, round, and reactive to light, extraocular eye movements intact, conjunctivae normal  Neck: supple and non-tender without mass, no thyromegaly or thyroid nodules, no cervical lymphadenopathy  Pulmonary/Chest: clear to auscultation bilaterally- no wheezes, rales or rhonchi, normal air movement, no respiratory distress  Cardiovascular: normal rate, regular rhythm, normal S1 and S2, no murmurs, rubs, clicks, or gallops, distal pulses intact, no carotid bruits  Abdomen: soft, non-tender, non-distended, normal bowel sounds, no masses or organomegaly  Extremities: no cyanosis, clubbing or edema  Musculoskeletal: normal range of motion, no joint swelling, deformity or tenderness  Neurologic:  no cranial nerve deficit, gait, coordination and speech normal    Patient's complete Health Risk Assessment and screening values have been reviewed and are found in Flowsheets. The following problems were reviewed today and where indicated follow up appointments were made and/or referrals ordered. Positive Risk Factor Screenings with Interventions:         Substance History:  Social History     Tobacco History     Smoking Status  Current Every Day Smoker Smoking Frequency  0.25 packs/day Smoking Tobacco Type  Cigarettes    Smokeless Tobacco Use  Never Used          Alcohol History     Alcohol Use Status  Not Currently Comment  very Infrequent          Drug Use     Drug Use Status  Never          Sexual Activity     Sexually Active  Not Asked               Alcohol Screening:       A score of 8 or more is associated with harmful or hazardous drinking. A score of 13 or more in women, and 15 or more in men, is likely to indicate alcohol dependence. Substance Abuse Interventions:  · long standing smoker, advised strongly to cut down or quit. General Health and ACP:  General  In general, how would you say your health is?: Good  In the past 7 days, have you experienced any of the following?  New or Increased Pain, New or Increased Fatigue, Loneliness, Social Isolation, Stress or Anger?: None of These  Do you get the social and emotional support that you need?: Yes  Do you have a Living Will?: Yes  Advance Directives     Power of Amanda Nolasco Will ACP-Advance Directive ACP-Power of     Not on File Not on File Not on File Not on File      General Health Risk Interventions:  · no issues above    Health Habits/Nutrition:  Health Habits/Nutrition  Do you exercise for at least 20 minutes 2-3 times per week?: (!) No  Have you lost any weight without trying in the past 3 months?: No  Do you eat only one meal per day?: No  Have you seen the dentist within the past year?: Yes  Body mass index: (!) 27.63  Health Habits/Nutrition Interventions:  · advised to work on diet and exercise also to help w wt loss. Personalized Preventive Plan   Current Health Maintenance Status  Immunization History   Administered Date(s) Administered    Influenza 12/04/2012        Health Maintenance   Topic Date Due    Hepatitis C screen  Never done    COVID-19 Vaccine (1) Never done    Pneumococcal 0-64 years Vaccine (1 of 2 - PPSV23) Never done    HIV screen  Never done    DTaP/Tdap/Td vaccine (1 - Tdap) Never done    Diabetes screen  Never done    Colon cancer screen colonoscopy  Never done    Shingles Vaccine (1 of 2) Never done    Breast cancer screen  09/30/2017    Flu vaccine (1) 09/01/2021    Potassium monitoring  10/21/2022    Creatinine monitoring  10/21/2022    Depression Screen  10/25/2022    Lipid screen  10/22/2026    Hepatitis A vaccine  Aged Out    Hepatitis B vaccine  Aged Out    Hib vaccine  Aged Out    Meningococcal (ACWY) vaccine  Aged Out     Recommendations for Thirsty Due: see orders and patient instructions/AVS.  . Recommended screening schedule for the next 5-10 years is provided to the patient in written form: see Patient Lucinda Landau was seen today for medicare awv and laryngitis.     Diagnoses and all orders for this visit:    Routine general medical examination at a health care facility - remains independent, functional and active, no indications/needs for other interventions noted at this time- except as noted below and also findings noted on screening medicare questions. Now part time and helping care for grandchild at home. -     Hepatitis C Antibody; Future    Primary hypertension - Blood pressure stable and will continue current regimen. Will plan periodic monitoring of renal function, electrolytes, lipid profile. -     losartan (COZAAR) 25 MG tablet; Take 1.5 tablets by mouth daily  -     carvedilol (COREG) 12.5 MG tablet; Take 1 tablet by mouth 2 times daily (with meals)    Cough- w prior RML pneumonia not treated last yr. Empiric course abx and pred taper--- steroids starting after lab drawn, then recheck cxr two weeks. -     XR CHEST STANDARD (2 VW); Future  -     doxycycline hyclate (VIBRA-TABS) 100 MG tablet; Take 1 tablet by mouth 2 times daily for 10 days  -     predniSONE (DELTASONE) 5 MG tablet; Take 6 tablets by mouth on day 1, 5 on day 2, 4 on day 3, 3 on day 4, 2 on day 5, 1 on day 6. Age-related osteoporosis without current pathological fracture- scr vit d, check dexa  -     Vitamin D 25 Hydroxy; Future    Visit for screening mammogram- - mammo ordered    Tobacco dependence - .pltiob      Mixed hyperlipidemia  - Pt will continue to work on a low fat diet and also exercise, wt loss as appropriate. Will continue periodic monitoring of fasting lipid profile, glucose, liver function. INTOL OF STATINS  -     Comprehensive Metabolic Panel; Future  -     Lipid Panel; Future  -     CBC Auto Differential; Future  -     TSH without Reflex;  Future    Screening for colorectal cancer  -     Faraz

## 2022-01-25 NOTE — PATIENT INSTRUCTIONS
Personalized Preventive Plan for Zachary Hinton - 1/25/2022  Medicare offers a range of preventive health benefits. Some of the tests and screenings are paid in full while other may be subject to a deductible, co-insurance, and/or copay. Some of these benefits include a comprehensive review of your medical history including lifestyle, illnesses that may run in your family, and various assessments and screenings as appropriate. After reviewing your medical record and screening and assessments performed today your provider may have ordered immunizations, labs, imaging, and/or referrals for you. A list of these orders (if applicable) as well as your Preventive Care list are included within your After Visit Summary for your review. Other Preventive Recommendations:    · A preventive eye exam performed by an eye specialist is recommended every 1-2 years to screen for glaucoma; cataracts, macular degeneration, and other eye disorders. · A preventive dental visit is recommended every 6 months. · Try to get at least 150 minutes of exercise per week or 10,000 steps per day on a pedometer . · Order or download the FREE \"Exercise & Physical Activity: Your Everyday Guide\" from [employer]. Call 2-800.974.6469 or search [employer] online. · You need 2175-9799 mg of calcium and 3884-7588 IU of vitamin D per day. It is possible to meet your calcium requirement with diet alone, but a vitamin D supplement is usually necessary to meet this goal.  · When exposed to the sun, use a sunscreen that protects against both UVA and UVB radiation with an SPF of 30 or greater. Reapply every 2 to 3 hours or after sweating, drying off with a towel, or swimming. · Always wear a seat belt when traveling in a car. Always wear a helmet when riding a bicycle or motorcycle.

## 2022-01-28 DIAGNOSIS — E55.9 VITAMIN D DEFICIENCY: Primary | ICD-10-CM

## 2022-01-28 DIAGNOSIS — M81.0 AGE-RELATED OSTEOPOROSIS WITHOUT CURRENT PATHOLOGICAL FRACTURE: ICD-10-CM

## 2022-01-28 DIAGNOSIS — Z00.00 ROUTINE GENERAL MEDICAL EXAMINATION AT A HEALTH CARE FACILITY: ICD-10-CM

## 2022-01-28 DIAGNOSIS — E78.2 MIXED HYPERLIPIDEMIA: ICD-10-CM

## 2022-01-28 LAB
A/G RATIO: 1.8 (ref 1.1–2.2)
ALBUMIN SERPL-MCNC: 4.6 G/DL (ref 3.4–5)
ALP BLD-CCNC: 136 U/L (ref 40–129)
ALT SERPL-CCNC: 23 U/L (ref 10–40)
ANION GAP SERPL CALCULATED.3IONS-SCNC: 14 MMOL/L (ref 3–16)
AST SERPL-CCNC: 19 U/L (ref 15–37)
BASOPHILS ABSOLUTE: 0 K/UL (ref 0–0.2)
BASOPHILS RELATIVE PERCENT: 0.3 %
BILIRUB SERPL-MCNC: 1.3 MG/DL (ref 0–1)
BUN BLDV-MCNC: 15 MG/DL (ref 7–20)
CALCIUM SERPL-MCNC: 10 MG/DL (ref 8.3–10.6)
CHLORIDE BLD-SCNC: 103 MMOL/L (ref 99–110)
CHOLESTEROL, TOTAL: 237 MG/DL (ref 0–199)
CO2: 26 MMOL/L (ref 21–32)
CREAT SERPL-MCNC: 0.7 MG/DL (ref 0.6–1.2)
EOSINOPHILS ABSOLUTE: 0.1 K/UL (ref 0–0.6)
EOSINOPHILS RELATIVE PERCENT: 1.4 %
GFR AFRICAN AMERICAN: >60
GFR NON-AFRICAN AMERICAN: >60
GLUCOSE BLD-MCNC: 116 MG/DL (ref 70–99)
HCT VFR BLD CALC: 44.5 % (ref 36–48)
HDLC SERPL-MCNC: 57 MG/DL (ref 40–60)
HEMOGLOBIN: 14.7 G/DL (ref 12–16)
HEPATITIS C ANTIBODY INTERPRETATION: NORMAL
LDL CHOLESTEROL CALCULATED: 146 MG/DL
LYMPHOCYTES ABSOLUTE: 2 K/UL (ref 1–5.1)
LYMPHOCYTES RELATIVE PERCENT: 28.5 %
MCH RBC QN AUTO: 31.1 PG (ref 26–34)
MCHC RBC AUTO-ENTMCNC: 33.1 G/DL (ref 31–36)
MCV RBC AUTO: 93.9 FL (ref 80–100)
MONOCYTES ABSOLUTE: 0.6 K/UL (ref 0–1.3)
MONOCYTES RELATIVE PERCENT: 8.7 %
NEUTROPHILS ABSOLUTE: 4.3 K/UL (ref 1.7–7.7)
NEUTROPHILS RELATIVE PERCENT: 61.1 %
PDW BLD-RTO: 14.6 % (ref 12.4–15.4)
PLATELET # BLD: 272 K/UL (ref 135–450)
PMV BLD AUTO: 7.5 FL (ref 5–10.5)
POTASSIUM SERPL-SCNC: 3.8 MMOL/L (ref 3.5–5.1)
RBC # BLD: 4.74 M/UL (ref 4–5.2)
SODIUM BLD-SCNC: 143 MMOL/L (ref 136–145)
TOTAL PROTEIN: 7.2 G/DL (ref 6.4–8.2)
TRIGL SERPL-MCNC: 170 MG/DL (ref 0–150)
TSH SERPL DL<=0.05 MIU/L-ACNC: 0.84 UIU/ML (ref 0.27–4.2)
VITAMIN D 25-HYDROXY: 8.6 NG/ML
VLDLC SERPL CALC-MCNC: 34 MG/DL
WBC # BLD: 7 K/UL (ref 4–11)

## 2022-01-28 PROCEDURE — 36415 COLL VENOUS BLD VENIPUNCTURE: CPT | Performed by: INTERNAL MEDICINE

## 2022-01-28 RX ORDER — MELATONIN
1000 DAILY
Qty: 30 TABLET | Refills: 0 | COMMUNITY
Start: 2022-01-28

## 2022-01-28 NOTE — RESULT ENCOUNTER NOTE
Call pt, labs ok, hep C antibody negative, thyroid fxn nl. Overall chol better w bad chol LDL dropping from 164--146 but still sl high so cont work w low fat diet. One important issue is her vit D level is very low, which can lead to incr risk for bone thinning. Rec to start vit D3 1000 units daily, Place med changes/corrections on EPIC medlist please  Then we whould recheck vit d level in 2-3 months to see if improves, dx vit D defic.   She can also supplement w diet such as fish, liver, supplemented milk and orange juice, cereals that have vit D added

## 2022-04-02 ENCOUNTER — OFFICE VISIT (OUTPATIENT)
Dept: FAMILY MEDICINE CLINIC | Age: 67
End: 2022-04-02
Payer: MEDICARE

## 2022-04-02 VITALS
OXYGEN SATURATION: 97 % | RESPIRATION RATE: 16 BRPM | HEIGHT: 63 IN | WEIGHT: 159 LBS | TEMPERATURE: 97 F | BODY MASS INDEX: 28.17 KG/M2

## 2022-04-02 DIAGNOSIS — J06.9 VIRAL URI: Primary | ICD-10-CM

## 2022-04-02 PROCEDURE — G8428 CUR MEDS NOT DOCUMENT: HCPCS | Performed by: PHYSICIAN ASSISTANT

## 2022-04-02 PROCEDURE — 1090F PRES/ABSN URINE INCON ASSESS: CPT | Performed by: PHYSICIAN ASSISTANT

## 2022-04-02 PROCEDURE — 4040F PNEUMOC VAC/ADMIN/RCVD: CPT | Performed by: PHYSICIAN ASSISTANT

## 2022-04-02 PROCEDURE — 1123F ACP DISCUSS/DSCN MKR DOCD: CPT | Performed by: PHYSICIAN ASSISTANT

## 2022-04-02 PROCEDURE — G8417 CALC BMI ABV UP PARAM F/U: HCPCS | Performed by: PHYSICIAN ASSISTANT

## 2022-04-02 PROCEDURE — 4004F PT TOBACCO SCREEN RCVD TLK: CPT | Performed by: PHYSICIAN ASSISTANT

## 2022-04-02 PROCEDURE — G8400 PT W/DXA NO RESULTS DOC: HCPCS | Performed by: PHYSICIAN ASSISTANT

## 2022-04-02 PROCEDURE — 99213 OFFICE O/P EST LOW 20 MIN: CPT | Performed by: PHYSICIAN ASSISTANT

## 2022-04-02 PROCEDURE — 3017F COLORECTAL CA SCREEN DOC REV: CPT | Performed by: PHYSICIAN ASSISTANT

## 2022-04-02 NOTE — PATIENT INSTRUCTIONS
Your COVID 19 test can take 1-5 days for the results to come back. We ask that you make a Mychart page and view your test results this way. You will need to Self quarantine until you know your results. If positive, please work on contact tracing. Increase fluids and rest  Saline nasal spray as needed for nasal congestion  Warm salt gargles as needed for throat discomfort  Monitor temperature twice a day  Tylenol as needed for fevers and/or discomfort. Big deep breaths periodically throughout the day  Regular Mucinex over the counter as needed for chest congestion  If symptoms worsen -Go to the ER. Follow up with your primary care provider      To Whom it May Concern:    Nick Cummings was tested for COVID-19 4/2/2022. He/she must stay home until test results are back. If test is positive, isolate for a total of 5 days, starting from day 1 of symptom onset. After 5 days, if fever-free for 24 hours and there has been a gradual improvement in symptoms, may come out of isolation, but must consistently wear a mask when around other people for 5 additional days. (5 days isolation, 5 days mask-wearing). We do not recommend retesting as patients may continue to test positive for months even though no longer contagious. It is suggested you call [facility] or [address] with any questions regarding isolation timeframe/return to work/school details. Patient Education        Viral Respiratory Infection: Care Instructions  Your Care Instructions     Viruses are very small organisms. They grow in number after they enter your body. There are many types that cause different illnesses, such as colds andthe mumps. The symptoms of a viral respiratory infection often start quickly. They include a fever, sore throat, and runny nose. You may also just not feel well. Or youmay not want to eat much. Most viral respiratory infections are not serious.  They usually get better withtime and self-care. Antibiotics are not used to treat a viral infection. That's because antibiotics will not help cure a viral illness. In some cases, antiviral medicine can helpyour body fight a serious viral infection. Follow-up care is a key part of your treatment and safety. Be sure to make and go to all appointments, and call your doctor if you are having problems. It's also a good idea to know your test results and keep alist of the medicines you take. How can you care for yourself at home?  Rest as much as possible until you feel better.  Be safe with medicines. Take your medicine exactly as prescribed. Call your doctor if you think you are having a problem with your medicine. You will get more details on the specific medicine your doctor prescribes.  Take an over-the-counter pain medicine, such as acetaminophen (Tylenol), ibuprofen (Advil, Motrin), or naproxen (Aleve), as needed for pain and fever. Read and follow all instructions on the label. Do not give aspirin to anyone younger than 20. It has been linked to Reye syndrome, a serious illness.  Drink plenty of fluids. Hot fluids, such as tea or soup, may help relieve congestion in your nose and throat. If you have kidney, heart, or liver disease and have to limit fluids, talk with your doctor before you increase the amount of fluids you drink.  Try to clear mucus from your lungs by breathing deeply and coughing.  Gargle with warm salt water once an hour. This can help reduce swelling and throat pain. Use 1 teaspoon of salt mixed in 1 cup of warm water.  Do not smoke or allow others to smoke around you. If you need help quitting, talk to your doctor about stop-smoking programs and medicines. These can increase your chances of quitting for good. To avoid spreading the virus   Cough or sneeze into a tissue. Then throw the tissue away.  If you don't have a tissue, use your hand to cover your cough or sneeze. Then clean your hand.  You can also cough into your sleeve.  Wash your hands often. Use soap and warm water. Wash for 15 to 20 seconds each time.  If you don't have soap and water near you, you can clean your hands with alcohol wipes or gel. When should you call for help? Call your doctor now or seek immediate medical care if:     You have a new or higher fever.      Your fever lasts more than 48 hours.      You have trouble breathing.      You have a fever with a stiff neck or a severe headache.      You are sensitive to light.      You feel very sleepy or confused. Watch closely for changes in your health, and be sure to contact your doctor if:     You do not get better as expected. Where can you learn more? Go to [email or web address]://[email or web address].[email or web address]. org and sign in to your [employer] account. Enter R476 in the "[employer] box to learn more about \"Viral Respiratory Infection: Care Instructions. \"     If you do not have an account, please click on the \"Sign Up Now\" link. Current as of: July 6, 2021               Content Version: 13.2  © 2006-2022 Healthwise, Incorporated. Care instructions adapted under license by [facility] ([facility]). If you have questions about a medical condition or this instruction, always ask your healthcare professional. [address] any warranty or liability for your use of this information.

## 2022-04-02 NOTE — PROGRESS NOTES
4/2/22  Hussain Cherry  1955    FLU/COVID-19 CLINIC EVALUATION    HPI SYMPTOMS:    Employer:    [] Fevers  [] Chills  [x] Cough  [] Coughing up blood  [x] Chest Congestion  [x] Nasal Congestion  [] Feeling short of breath  [] Sometimes  [] Frequently  [] All the time  [] Chest pain  [] Headaches  []Tolerable  [] Severe  [] Sore throat  [] Muscle aches  [] Nausea  [] Vomiting  []Unable to keep fluids down  [] Diarrhea  []Severe    [x] OTHER SYMPTOMS:  Ear ache  Symptom Duration:   [x] 1  [] 2   [] 3   [] 4    [] 5   [] 6   [] 7   [] 8   [] 9   [] 10   [] 11   [] 12   [] 13   [] 14   [] Longer than 14 days    Symptom course:   [x] Worsening     [] Stable     [] Improving    RISK FACTORS:    [] Pregnant or possibly pregnant  [x] Age over 61  [] Diabetes  [] Heart disease  [] Asthma  [] COPD/Other chronic lung diseases  [] Active Cancer  [] On Chemotherapy  [] Taking oral steroids  [] History Lymphoma/Leukemia  [] Close contact with a lab confirmed COVID-19 patient within 14 days of symptom onset  [] History of travel from affected geographical areas within 14 days of symptom onset       VITALS:  There were no vitals filed for this visit. TESTS:    POCT FLU:  [] Positive     []Negative    ASSESSMENT:    [] Flu  [] Possible COVID-19  [] Strep    PLAN:    [] Discharge home with written instructions for:  [] Flu management  [] Possible COVID-19 infection with self-quarantine and management of symptoms  [] Follow-up with primary care physician or emergency department if worsens  [] Evaluation per physician/NP/PA in clinic  [] Sent to ER       An  electronic signature was used to authenticate this note.      --Jade Brennan MA on 4/2/2022 at 10:06 AM

## 2022-04-02 NOTE — PROGRESS NOTES
4/2/2022    HPI:  Chief complaint and history of present illness as per medical assistant/nurse documented today in the Flu/COVID-19 clinic. Patient complains of 1 day of productive cough, nasal congestion, headache, sore throat and right ear pain. She denies Covid vaccination or any close contacts with Covid positive or sick individuals. She contracted COVID last November. She took advil cold and flu with little relief. No asthma, copd or heart disease, no fever, cp or SOB    MEDICATIONS:  Prior to Visit Medications    Medication Sig Taking? Authorizing Provider   vitamin D3 (CHOLECALCIFEROL) 25 MCG (1000 UT) TABS tablet Take 1 tablet by mouth daily  Jeannine Yepez MD   losartan (COZAAR) 25 MG tablet Take 1.5 tablets by mouth daily  Jeannine Yepez MD   carvedilol (COREG) 12.5 MG tablet Take 1 tablet by mouth 2 times daily (with meals)  Jeannine Yepez MD   predniSONE (DELTASONE) 5 MG tablet Take 6 tablets by mouth on day 1, 5 on day 2, 4 on day 3, 3 on day 4, 2 on day 5, 1 on day 6. Jeannine Yepez MD   aspirin 81 MG chewable tablet Take 1 tablet by mouth daily  Genoveva Shukla, APRN - CNP           PHYSICAL EXAM:  Physical Exam    Vitals:    04/02/22 1007   Resp: 16   Temp: 97 °F (36.1 °C)   SpO2: 97%         Constitutional:  Well developed, well nourished  HENT:  Normocephalic, atraumatic, right middle ear effusion, bilateral ear canals normal, right bulging TM, oropharynx erythematous, no swelling or exudate, nose normal  Eyes:  conjunctiva normal, no discharge, no scleral icterus  Cardiovascular:  Normal heart rate, normal rhythm, no murmurs, gallops or rubs  Thorax & Lungs:  Normal breath sounds, no respiratory distress, no wheezing, no rales, no rhonchi  Skin:  Warm, dry, no erythema, no rash  Neurologic:  Alert & oriented   Psychiatric:  Affect normal, mood normal    ASSESSMENT/PLAN:  1.  Viral URI  Recommend flonase, benzonatate and otc cold and flu medications  Pt to self test, isolation precautions reviewed        I did don appropriate PPE (including N95 face mask, protective safety glasses, face shield, gloves, and gown) as recommended by the health facility/national standard best practice, during my interaction with the patient. FOLLOW-UP:  Return if symptoms worsen or fail to improve.     In addition to other information, the printed after visit summary provided to the patient includes:  [x] COVID-19 Self care instructions  [x] COVID-19 General patient information    Diego Preston

## 2022-04-05 ENCOUNTER — TELEMEDICINE (OUTPATIENT)
Dept: INTERNAL MEDICINE CLINIC | Age: 67
End: 2022-04-05
Payer: MEDICARE

## 2022-04-05 DIAGNOSIS — J01.00 ACUTE NON-RECURRENT MAXILLARY SINUSITIS: Primary | ICD-10-CM

## 2022-04-05 PROCEDURE — 4040F PNEUMOC VAC/ADMIN/RCVD: CPT | Performed by: INTERNAL MEDICINE

## 2022-04-05 PROCEDURE — G8400 PT W/DXA NO RESULTS DOC: HCPCS | Performed by: INTERNAL MEDICINE

## 2022-04-05 PROCEDURE — 4004F PT TOBACCO SCREEN RCVD TLK: CPT | Performed by: INTERNAL MEDICINE

## 2022-04-05 PROCEDURE — 99213 OFFICE O/P EST LOW 20 MIN: CPT | Performed by: INTERNAL MEDICINE

## 2022-04-05 PROCEDURE — G8427 DOCREV CUR MEDS BY ELIG CLIN: HCPCS | Performed by: INTERNAL MEDICINE

## 2022-04-05 PROCEDURE — 1090F PRES/ABSN URINE INCON ASSESS: CPT | Performed by: INTERNAL MEDICINE

## 2022-04-05 PROCEDURE — 1123F ACP DISCUSS/DSCN MKR DOCD: CPT | Performed by: INTERNAL MEDICINE

## 2022-04-05 PROCEDURE — G8417 CALC BMI ABV UP PARAM F/U: HCPCS | Performed by: INTERNAL MEDICINE

## 2022-04-05 PROCEDURE — 3017F COLORECTAL CA SCREEN DOC REV: CPT | Performed by: INTERNAL MEDICINE

## 2022-04-05 RX ORDER — DOXYCYCLINE HYCLATE 100 MG
100 TABLET ORAL 2 TIMES DAILY
Qty: 20 TABLET | Refills: 0 | Status: SHIPPED | OUTPATIENT
Start: 2022-04-05 | End: 2022-04-15

## 2022-04-05 RX ORDER — BENZONATATE 100 MG/1
100 CAPSULE ORAL 3 TIMES DAILY PRN
Qty: 30 CAPSULE | Refills: 0 | Status: SHIPPED | OUTPATIENT
Start: 2022-04-05 | End: 2022-04-15

## 2022-04-05 RX ORDER — PREDNISONE 1 MG/1
TABLET ORAL
Qty: 36 TABLET | Refills: 0 | Status: SHIPPED | OUTPATIENT
Start: 2022-04-05 | End: 2022-07-25 | Stop reason: ALTCHOICE

## 2022-04-05 SDOH — ECONOMIC STABILITY: FOOD INSECURITY: WITHIN THE PAST 12 MONTHS, THE FOOD YOU BOUGHT JUST DIDN'T LAST AND YOU DIDN'T HAVE MONEY TO GET MORE.: NEVER TRUE

## 2022-04-05 SDOH — ECONOMIC STABILITY: FOOD INSECURITY: WITHIN THE PAST 12 MONTHS, YOU WORRIED THAT YOUR FOOD WOULD RUN OUT BEFORE YOU GOT MONEY TO BUY MORE.: NEVER TRUE

## 2022-04-05 ASSESSMENT — SOCIAL DETERMINANTS OF HEALTH (SDOH): HOW HARD IS IT FOR YOU TO PAY FOR THE VERY BASICS LIKE FOOD, HOUSING, MEDICAL CARE, AND HEATING?: NOT HARD AT ALL

## 2022-04-05 NOTE — PROGRESS NOTES
2022    TELEHEALTH EVALUATION -- Audio/Visual (During OLJRV-46 public health emergency)    HPI:    Heather Reyes (:  1955) has requested an audio/video evaluation for the following concern(s):    5d hx of worsening sinus congestion and cough as well as chest congestion. Home COVID test neg. Sputum is green. Seen by Matti CANTRELL at [facility] and dx'd w viral URI, R Tm was bulging. No meds prescribed. Review of Systems    Prior to Visit Medications    Medication Sig Taking? Authorizing Provider   vitamin D3 (CHOLECALCIFEROL) 25 MCG (1000 UT) TABS tablet Take 1 tablet by mouth daily Yes Dima Naranjo MD   losartan (COZAAR) 25 MG tablet Take 1.5 tablets by mouth daily Yes Dima Naranjo MD   carvedilol (COREG) 12.5 MG tablet Take 1 tablet by mouth 2 times daily (with meals) Yes Dima Naranjo MD   aspirin 81 MG chewable tablet Take 1 tablet by mouth daily Yes MAIN Sanchez - CNP       Social History     Tobacco Use    Smoking status: Current Every Day Smoker     Packs/day: 0.25     Years: 35.00     Pack years: 8.75     Types: Cigarettes    Smokeless tobacco: Never Used   Substance Use Topics    Alcohol use: Not Currently     Comment: very Infrequent    Drug use: Never             PHYSICAL EXAMINATION:  [ INSTRUCTIONS:  \"[x]\" Indicates a positive item  \"[]\" Indicates a negative item  -- DELETE ALL ITEMS NOT EXAMINED]  Vital Signs: (As obtained by patient/caregiver or practitioner observation)    Blood pressure-  Heart rate-    Respiratory rate-    Temperature-  Pulse oximetry-     Constitutional: [x] Appears well-developed and well-nourished [] No apparent distress      [] Abnormal-   Mental status  [] Alert and awake  [] Oriented to person/place/time []Able to follow commands      Eyes:  EOM    []  Normal  [] Abnormal-  Sclera  []  Normal  [] Abnormal -         Discharge []  None visible  [] Abnormal -    HENT:   [] Normocephalic, atraumatic.   [] Abnormal   [] Mouth/Throat: Mucous membranes are moist.     External Ears [] Normal  [] Abnormal-     Neck: [] No visualized mass     Pulmonary/Chest: [x] Respiratory effort normal.  [] No visualized signs of difficulty breathing or respiratory distress        [] Abnormal-      Musculoskeletal:   [] Normal gait with no signs of ataxia         [] Normal range of motion of neck        [] Abnormal-       Neurological:        [] No Facial Asymmetry (Cranial nerve 7 motor function) (limited exam to video visit)          [] No gaze palsy        [] Abnormal-         Skin:        [] No significant exanthematous lesions or discoloration noted on facial skin         [] Abnormal-            Psychiatric:       [] Normal Affect [] No Hallucinations        [] Abnormal-     Other pertinent observable physical exam findings-     ASSESSMENT/PLAN:  1. Acute non-recurrent maxillary sinusitis  SX GETTING PROLONGED AND WORSENING, WE'LL PROCEED W MORE AGGRESSIVE CARE ON EMPIRIC ABX AND STEROID, PRN TESSALON. CONSIDER CXR IF NO BETTER IN A WEEK  - doxycycline hyclate (VIBRA-TABS) 100 MG tablet; Take 1 tablet by mouth 2 times daily for 10 days  Dispense: 20 tablet; Refill: 0  - benzonatate (TESSALON) 100 MG capsule; Take 1 capsule by mouth 3 times daily as needed for Cough  Dispense: 30 capsule; Refill: 0  - predniSONE (DELTASONE) 5 MG tablet; Take 8 pills, then 7,6,5,4,3,2,1  Dispense: 36 tablet; Refill: 0      Return if symptoms worsen or fail to improve. Olivia Monahan, was evaluated through a synchronous (real-time) audio-video encounter. The patient (or guardian if applicable) is aware that this is a billable service, which includes applicable co-pays. This Virtual Visit was conducted with patient's (and/or legal guardian's) consent.  The visit was conducted pursuant to the emergency declaration under the [address] [place] [address] authority and the [email or web address] and "[employer] [facility] Act.  Patient identification was verified, and a caregiver was present when appropriate. The patient was located at home in a state where the provider was licensed to provide care. Total time spent on this encounter: Not billed by time    --Rossana Ponce MD on 4/5/2022 at 8:31 AM    An electronic signature was used to authenticate this note.

## 2022-04-05 NOTE — LETTER
[facility] INTERNAL MEDICINE  [address] [address]  Phone: [phone]  Fax: [phone]    Eric Uriostegui MD        April 5, 2022     Patient: Thresa Collet   YOB: 1955   Date of Visit: 4/5/2022       To Whom it May Concern:    Florencio Hou was seen in my clinic on 4/5/2022. She may be excused from work 4/5/2022 and 4/6/2022. She will return to work on 4/7/2022 with no restrictions. If you have any questions or concerns, please don't hesitate to call.     Sincerely,         Eric Uriostegui MD

## 2022-04-05 NOTE — LETTER
[facility] INTERNAL MEDICINE  [address] [address]  Phone: [phone]  Fax: [phone]    Dima Naranjo MD        April 5, 2022     Patient: Heather Reyes   YOB: 1955   Date of Visit: 4/5/2022       To Whom it May Concern:    Kemar Cox was seen in my clinic on 4/5/2022. She may be excused from work 4/5/2022. She will return to work 4/6/2022 with no restrictions. If you have any questions or concerns, please don't hesitate to call.     Sincerely,         Dima Naranjo MD

## 2022-07-25 ENCOUNTER — OFFICE VISIT (OUTPATIENT)
Dept: INTERNAL MEDICINE CLINIC | Age: 67
End: 2022-07-25
Payer: MEDICARE

## 2022-07-25 VITALS
SYSTOLIC BLOOD PRESSURE: 120 MMHG | HEART RATE: 59 BPM | DIASTOLIC BLOOD PRESSURE: 78 MMHG | HEIGHT: 63 IN | RESPIRATION RATE: 18 BRPM | OXYGEN SATURATION: 99 % | WEIGHT: 159 LBS | BODY MASS INDEX: 28.17 KG/M2

## 2022-07-25 DIAGNOSIS — I10 PRIMARY HYPERTENSION: Primary | ICD-10-CM

## 2022-07-25 DIAGNOSIS — Z12.31 VISIT FOR SCREENING MAMMOGRAM: ICD-10-CM

## 2022-07-25 DIAGNOSIS — E78.2 MIXED HYPERLIPIDEMIA: ICD-10-CM

## 2022-07-25 DIAGNOSIS — I10 PRIMARY HYPERTENSION: ICD-10-CM

## 2022-07-25 DIAGNOSIS — M81.0 AGE-RELATED OSTEOPOROSIS WITHOUT CURRENT PATHOLOGICAL FRACTURE: ICD-10-CM

## 2022-07-25 DIAGNOSIS — E55.9 VITAMIN D DEFICIENCY: ICD-10-CM

## 2022-07-25 LAB
A/G RATIO: 1.8 (ref 1.1–2.2)
ALBUMIN SERPL-MCNC: 4.4 G/DL (ref 3.4–5)
ALP BLD-CCNC: 135 U/L (ref 40–129)
ALT SERPL-CCNC: 21 U/L (ref 10–40)
ANION GAP SERPL CALCULATED.3IONS-SCNC: 11 MMOL/L (ref 3–16)
AST SERPL-CCNC: 19 U/L (ref 15–37)
BASOPHILS ABSOLUTE: 0 K/UL (ref 0–0.2)
BASOPHILS RELATIVE PERCENT: 0.5 %
BILIRUB SERPL-MCNC: 1 MG/DL (ref 0–1)
BUN BLDV-MCNC: 14 MG/DL (ref 7–20)
CALCIUM SERPL-MCNC: 9.8 MG/DL (ref 8.3–10.6)
CHLORIDE BLD-SCNC: 107 MMOL/L (ref 99–110)
CHOLESTEROL, TOTAL: 270 MG/DL (ref 0–199)
CO2: 24 MMOL/L (ref 21–32)
CREAT SERPL-MCNC: 0.7 MG/DL (ref 0.6–1.2)
EOSINOPHILS ABSOLUTE: 0.1 K/UL (ref 0–0.6)
EOSINOPHILS RELATIVE PERCENT: 2 %
GFR AFRICAN AMERICAN: >60
GFR NON-AFRICAN AMERICAN: >60
GLUCOSE BLD-MCNC: 110 MG/DL (ref 70–99)
HCT VFR BLD CALC: 42 % (ref 36–48)
HDLC SERPL-MCNC: 48 MG/DL (ref 40–60)
HEMOGLOBIN: 14.3 G/DL (ref 12–16)
LDL CHOLESTEROL CALCULATED: 164 MG/DL
LYMPHOCYTES ABSOLUTE: 2 K/UL (ref 1–5.1)
LYMPHOCYTES RELATIVE PERCENT: 35 %
MCH RBC QN AUTO: 32.1 PG (ref 26–34)
MCHC RBC AUTO-ENTMCNC: 34 G/DL (ref 31–36)
MCV RBC AUTO: 94.3 FL (ref 80–100)
MONOCYTES ABSOLUTE: 0.6 K/UL (ref 0–1.3)
MONOCYTES RELATIVE PERCENT: 10.2 %
NEUTROPHILS ABSOLUTE: 3 K/UL (ref 1.7–7.7)
NEUTROPHILS RELATIVE PERCENT: 52.3 %
PDW BLD-RTO: 13.5 % (ref 12.4–15.4)
PLATELET # BLD: 266 K/UL (ref 135–450)
PMV BLD AUTO: 7.6 FL (ref 5–10.5)
POTASSIUM SERPL-SCNC: 4.4 MMOL/L (ref 3.5–5.1)
RBC # BLD: 4.45 M/UL (ref 4–5.2)
SODIUM BLD-SCNC: 142 MMOL/L (ref 136–145)
TOTAL PROTEIN: 6.8 G/DL (ref 6.4–8.2)
TRIGL SERPL-MCNC: 291 MG/DL (ref 0–150)
TSH SERPL DL<=0.05 MIU/L-ACNC: 1.38 UIU/ML (ref 0.27–4.2)
VITAMIN D 25-HYDROXY: 24.9 NG/ML
VLDLC SERPL CALC-MCNC: 58 MG/DL
WBC # BLD: 5.8 K/UL (ref 4–11)

## 2022-07-25 PROCEDURE — G8417 CALC BMI ABV UP PARAM F/U: HCPCS | Performed by: INTERNAL MEDICINE

## 2022-07-25 PROCEDURE — 3017F COLORECTAL CA SCREEN DOC REV: CPT | Performed by: INTERNAL MEDICINE

## 2022-07-25 PROCEDURE — 1090F PRES/ABSN URINE INCON ASSESS: CPT | Performed by: INTERNAL MEDICINE

## 2022-07-25 PROCEDURE — G8427 DOCREV CUR MEDS BY ELIG CLIN: HCPCS | Performed by: INTERNAL MEDICINE

## 2022-07-25 PROCEDURE — 36415 COLL VENOUS BLD VENIPUNCTURE: CPT | Performed by: INTERNAL MEDICINE

## 2022-07-25 PROCEDURE — 99214 OFFICE O/P EST MOD 30 MIN: CPT | Performed by: INTERNAL MEDICINE

## 2022-07-25 PROCEDURE — 4004F PT TOBACCO SCREEN RCVD TLK: CPT | Performed by: INTERNAL MEDICINE

## 2022-07-25 PROCEDURE — 1123F ACP DISCUSS/DSCN MKR DOCD: CPT | Performed by: INTERNAL MEDICINE

## 2022-07-25 PROCEDURE — G8400 PT W/DXA NO RESULTS DOC: HCPCS | Performed by: INTERNAL MEDICINE

## 2022-07-25 RX ORDER — LOSARTAN POTASSIUM 25 MG/1
37.5 TABLET ORAL DAILY
Qty: 135 TABLET | Refills: 1 | Status: SHIPPED | OUTPATIENT
Start: 2022-07-25 | End: 2022-10-23

## 2022-07-25 RX ORDER — CARVEDILOL 12.5 MG/1
12.5 TABLET ORAL 2 TIMES DAILY WITH MEALS
Qty: 180 TABLET | Refills: 1 | Status: SHIPPED | OUTPATIENT
Start: 2022-07-25

## 2022-07-25 NOTE — PROGRESS NOTES
Minesh Agatha  1955 07/25/22    SUBJECTIVE:  now part time since Jan, at [employer]. Hypertension: Stable. Denies CP, SOB, cough, visual changes, dizziness, palpitations or HA. Hypertension: Stable. Denies CP, SOB, cough, visual changes, dizziness, palpitations or HA. Is willing to do mammogram and dexa, defers colorectal screening. Vit D defic and we are monitoring levels. VERY LOW LAST TIME. OBJECTIVE:    /78   Pulse 59   Resp 18   Ht 5' 3\" (1.6 m)   Wt 159 lb (72.1 kg)   SpO2 99%   BMI 28.17 kg/m²     Physical Exam  Vitals reviewed. Constitutional:       Appearance: She is well-developed. HENT:      Head: Normocephalic and atraumatic. Eyes:      General: No scleral icterus. Right eye: No discharge. Left eye: No discharge. Conjunctiva/sclera: Conjunctivae normal.      Pupils: Pupils are equal, round, and reactive to light. Neck:      Thyroid: No thyromegaly. Vascular: No JVD. Trachea: No tracheal deviation. Cardiovascular:      Rate and Rhythm: Normal rate and regular rhythm. Heart sounds: Normal heart sounds. No murmur heard. No friction rub. No gallop. Pulmonary:      Effort: Pulmonary effort is normal. No respiratory distress. Breath sounds: Normal breath sounds. No wheezing or rales. Abdominal:      General: Bowel sounds are normal. There is no distension. Palpations: Abdomen is soft. There is no mass. Tenderness: There is no abdominal tenderness. There is no guarding or rebound. Hernia: No hernia is present. Musculoskeletal:      Cervical back: Neck supple. Lymphadenopathy:      Cervical: No cervical adenopathy. Skin:     General: Skin is warm and dry. Neurological:      Mental Status: She is alert. Psychiatric:         Mood and Affect: Mood normal.       ASSESSMENT:    1. Primary hypertension    2. Mixed hyperlipidemia    3. Vitamin D deficiency    4.  Visit for screening mammogram    5. Age-related osteoporosis without current pathological fracture        PLAN:    Luz was seen today for 6 month follow-up and medication refill. Diagnoses and all orders for this visit:    Primary hypertension - Blood pressure stable and will continue current regimen. Will plan periodic monitoring of renal function, electrolytes, lipid profile. -     losartan (COZAAR) 25 MG tablet; Take 1.5 tablets by mouth in the morning.  -     carvedilol (COREG) 12.5 MG tablet; Take 1 tablet by mouth in the morning and 1 tablet in the evening. Take with meals. -     Comprehensive Metabolic Panel; Future  -     Lipid Panel; Future  -     CBC with Auto Differential; Future    Mixed hyperlipidemia  - Pt will continue to work on a low fat diet and also exercise, wt loss as appropriate. Will continue periodic monitoring of fasting lipid profile, glucose, liver function. -     Comprehensive Metabolic Panel; Future  -     Lipid Panel; Future  -     CBC with Auto Differential; Future  -     TSH; Future    Vitamin D deficiency- ON SUPPLEMENT AND F/U LEVELS  -     Vitamin D 25 Hydroxy; Future    Visit for screening mammogram  -     ESTER DIGITAL SCREEN W CAD BILATERAL PER PROTOCOL; Future    Age-related osteoporosis without current pathological fracture  -     Vitamin D 25 Hydroxy;  Future  -     DEXA BONE DENSITY AXIAL SKELETON; Future

## 2022-07-26 NOTE — RESULT ENCOUNTER NOTE
Call pt, labs ok OVERALL W VIT D MUCH BETTER, NEARLY NORMAL FROM PRIOR 8.6--24.9. OK TO CONT CURRENT VIT D SUPPLEMENT BUT TO BE SURE IS TAKING DAILY.   THYROID FXN NL.   CHOL IS SL WORSE RISING FROM 237-270 SO DOES NEED A LITTLE MORE WORK ON LOW FAT DIET

## 2022-10-04 ENCOUNTER — COMMUNITY OUTREACH (OUTPATIENT)
Dept: INTERNAL MEDICINE CLINIC | Age: 67
End: 2022-10-04

## 2022-10-28 ENCOUNTER — COMMUNITY OUTREACH (OUTPATIENT)
Dept: INTERNAL MEDICINE CLINIC | Age: 67
End: 2022-10-28

## 2022-10-28 NOTE — PROGRESS NOTES
Patient's HM shows they are overdue for Mammogram, Colorectal Screening and Cervical Cancer Screening. [employer] and  files searched. No results to attach to order nor HM updated.

## 2022-11-09 ENCOUNTER — TELEMEDICINE (OUTPATIENT)
Dept: INTERNAL MEDICINE CLINIC | Age: 67
End: 2022-11-09
Payer: MEDICARE

## 2022-11-09 DIAGNOSIS — J01.00 ACUTE NON-RECURRENT MAXILLARY SINUSITIS: Primary | ICD-10-CM

## 2022-11-09 PROCEDURE — 99213 OFFICE O/P EST LOW 20 MIN: CPT | Performed by: NURSE PRACTITIONER

## 2022-11-09 PROCEDURE — 1090F PRES/ABSN URINE INCON ASSESS: CPT | Performed by: NURSE PRACTITIONER

## 2022-11-09 PROCEDURE — G8400 PT W/DXA NO RESULTS DOC: HCPCS | Performed by: NURSE PRACTITIONER

## 2022-11-09 PROCEDURE — 3017F COLORECTAL CA SCREEN DOC REV: CPT | Performed by: NURSE PRACTITIONER

## 2022-11-09 PROCEDURE — 1123F ACP DISCUSS/DSCN MKR DOCD: CPT | Performed by: NURSE PRACTITIONER

## 2022-11-09 PROCEDURE — G8427 DOCREV CUR MEDS BY ELIG CLIN: HCPCS | Performed by: NURSE PRACTITIONER

## 2022-11-09 RX ORDER — AMOXICILLIN AND CLAVULANATE POTASSIUM 875; 125 MG/1; MG/1
1 TABLET, FILM COATED ORAL 2 TIMES DAILY
Qty: 14 TABLET | Refills: 0 | Status: SHIPPED
Start: 2022-11-09 | End: 2022-11-15 | Stop reason: ALTCHOICE

## 2022-11-09 RX ORDER — PREDNISONE 10 MG/1
TABLET ORAL
Qty: 20 TABLET | Refills: 0 | Status: SHIPPED | OUTPATIENT
Start: 2022-11-09 | End: 2022-11-15 | Stop reason: SDUPTHER

## 2022-11-09 ASSESSMENT — ENCOUNTER SYMPTOMS
ABDOMINAL PAIN: 0
NAUSEA: 0
APNEA: 0
SINUS PRESSURE: 1
DIARRHEA: 0
CHEST TIGHTNESS: 0
COLOR CHANGE: 0
SINUS PAIN: 1
RHINORRHEA: 1
COUGH: 1
VOMITING: 0
SHORTNESS OF BREATH: 0

## 2022-11-09 NOTE — PROGRESS NOTES
2022    TELEHEALTH EVALUATION -- Audio/Visual (During [facility]-25 public health emergency)    HPI:    Zoraida Chen (:  1955) has requested an audio/video evaluation for the following concern(s):    Mrs Helen Murray is a current patient of Dr Irving Talavera who presents via VV today for c/o worsening sinus pain/pressure, nasal congestion, PND, and pharyngitis which has been ongoing over the last week. Also with frontal headaches for the same duration. Denies any fevers or chills, or known recent sick contacts. She has been taking Advil Cold & Sinus with no improvement in symptoms. Review of Systems   Constitutional:  Negative for activity change, appetite change, fatigue and fever. HENT:  Positive for congestion, postnasal drip, rhinorrhea, sinus pressure and sinus pain. Negative for nosebleeds. Respiratory:  Positive for cough. Negative for apnea, chest tightness and shortness of breath. Cardiovascular:  Negative for chest pain and palpitations. Gastrointestinal:  Negative for abdominal pain, diarrhea, nausea and vomiting. Genitourinary:  Negative for difficulty urinating, flank pain and hematuria. Musculoskeletal:  Negative for arthralgias, joint swelling and myalgias. Skin:  Negative for color change and rash. Neurological:  Negative for dizziness, light-headedness and headaches. Psychiatric/Behavioral: Negative. Negative for behavioral problems. Prior to Visit Medications    Medication Sig Taking? Authorizing Provider   amoxicillin-clavulanate (AUGMENTIN) 875-125 MG per tablet Take 1 tablet by mouth 2 times daily for 7 days Yes MAIN Conti CNP   predniSONE (DELTASONE) 10 MG tablet Take 4 tablets daily for 2 days, then 3 tablets for 2 days, 2 tablets for 2 days, then 1 tablet for 2 days Yes MAIN Conti CNP   carvedilol (COREG) 12.5 MG tablet Take 1 tablet by mouth in the morning and 1 tablet in the evening. Take with meals.  Yes Olimpia Shen MD   vitamin D3 (CHOLECALCIFEROL) 25 MCG (1000 UT) TABS tablet Take 1 tablet by mouth daily Yes Darrin Eid MD   aspirin 81 MG chewable tablet Take 1 tablet by mouth daily Yes MAIN Kam - CNP   losartan (COZAAR) 25 MG tablet Take 1.5 tablets by mouth in the morning. Darrin Eid MD       Social History     Tobacco Use    Smoking status: Every Day     Packs/day: 0.25     Years: 35.00     Pack years: 8.75     Types: Cigarettes    Smokeless tobacco: Never   Substance Use Topics    Alcohol use: Not Currently     Comment: very Infrequent    Drug use: Never        Allergies   Allergen Reactions    Codeine Nausea And Vomiting    Crestor [Rosuvastatin Calcium]      myalgias    Influenza Virus Vaccine      Gets sick    Lipitor      Elevated alk phos    Statins      Elevated LFTs    Valtrex [Valacyclovir]      Felt drunk, slurred speech   ,   Past Medical History:   Diagnosis Date    Allergic rhinitis     Elevated alkaline phosphatase level     H/O echocardiogram 07/23/2013 7/13-EF >55% Borderline MVP, Trace MR. Hip pain     Hyperlipidemia     Left hand pain     injury w hand stuck in an auger  early May 2012    Menopause     Migraine headache     Mitral valve prolapse     mild on echo 7/23/13    Posterior vitreous detachment     R eye, Dr Zara Neil    Primary hypertension 10/2021    admitted obs for high bp 10/21, corrected w coreg and w/u benign.     S/P total hysterectomy and BSO (bilateral salpingo-oophorectomy)     Tobacco dependence     Vitamin D deficiency 01/28/2022   ,   Past Surgical History:   Procedure Laterality Date    [address] [facility], LAPAROSCOPIC  4/04    Dr. Cleo Sutton    right for dislocation    HYSTERECTOMY ([address])  [address]  4/04    biliary stent Dr. Norma Kinsey- removed the following month    [address]   ,   Social History     Tobacco Use    Smoking status: not resolved. - amoxicillin-clavulanate (AUGMENTIN) 875-125 MG per tablet; Take 1 tablet by mouth 2 times daily for 7 days  Dispense: 14 tablet; Refill: 0  - predniSONE (DELTASONE) 10 MG tablet; Take 4 tablets daily for 2 days, then 3 tablets for 2 days, 2 tablets for 2 days, then 1 tablet for 2 days  Dispense: 20 tablet; Refill: 0    Return if symptoms worsen or fail to improve. Nuris Rosales, was evaluated through a synchronous (real-time) audio-video encounter. The patient (or guardian if applicable) is aware that this is a billable service, which includes applicable co-pays. This Virtual Visit was conducted with patient's (and/or legal guardian's) consent. The visit was conducted pursuant to the emergency declaration under the [address] [place] [address], [address] [place] [address] authority and the [employer] and [employer] Act. Patient identification was verified, and a caregiver was present when appropriate. The patient was located at Home: [place] [id number]. Provider was located at [facility] (Appt Dept): [address]  [address] [place] [address]. Total time spent on this encounter:  15 minutes    --MAIN Mayfield CNP on 11/9/2022 at 4:00 PM    An electronic signature was used to authenticate this note.

## 2022-11-15 DIAGNOSIS — J01.00 ACUTE NON-RECURRENT MAXILLARY SINUSITIS: ICD-10-CM

## 2022-11-15 RX ORDER — DOXYCYCLINE HYCLATE 100 MG
100 TABLET ORAL 2 TIMES DAILY
Qty: 14 TABLET | Refills: 0 | Status: SHIPPED | OUTPATIENT
Start: 2022-11-15 | End: 2022-11-22

## 2022-11-15 RX ORDER — PREDNISONE 10 MG/1
TABLET ORAL
Qty: 20 TABLET | Refills: 0 | Status: SHIPPED | OUTPATIENT
Start: 2022-11-15

## 2023-01-21 DIAGNOSIS — I10 PRIMARY HYPERTENSION: ICD-10-CM

## 2023-01-23 ENCOUNTER — OFFICE VISIT (OUTPATIENT)
Dept: INTERNAL MEDICINE CLINIC | Age: 68
End: 2023-01-23
Payer: MEDICARE

## 2023-01-23 VITALS
DIASTOLIC BLOOD PRESSURE: 78 MMHG | BODY MASS INDEX: 28.7 KG/M2 | OXYGEN SATURATION: 98 % | HEART RATE: 70 BPM | RESPIRATION RATE: 14 BRPM | WEIGHT: 162 LBS | SYSTOLIC BLOOD PRESSURE: 122 MMHG

## 2023-01-23 DIAGNOSIS — Z78.9 STATIN INTOLERANCE: ICD-10-CM

## 2023-01-23 DIAGNOSIS — F17.200 TOBACCO DEPENDENCE: ICD-10-CM

## 2023-01-23 DIAGNOSIS — E55.9 VITAMIN D DEFICIENCY: ICD-10-CM

## 2023-01-23 DIAGNOSIS — R73.9 HYPERGLYCEMIA: ICD-10-CM

## 2023-01-23 DIAGNOSIS — I10 PRIMARY HYPERTENSION: Primary | ICD-10-CM

## 2023-01-23 DIAGNOSIS — E78.2 MIXED HYPERLIPIDEMIA: ICD-10-CM

## 2023-01-23 PROCEDURE — 99214 OFFICE O/P EST MOD 30 MIN: CPT | Performed by: INTERNAL MEDICINE

## 2023-01-23 PROCEDURE — 3074F SYST BP LT 130 MM HG: CPT | Performed by: INTERNAL MEDICINE

## 2023-01-23 PROCEDURE — 1090F PRES/ABSN URINE INCON ASSESS: CPT | Performed by: INTERNAL MEDICINE

## 2023-01-23 PROCEDURE — 1123F ACP DISCUSS/DSCN MKR DOCD: CPT | Performed by: INTERNAL MEDICINE

## 2023-01-23 PROCEDURE — G8417 CALC BMI ABV UP PARAM F/U: HCPCS | Performed by: INTERNAL MEDICINE

## 2023-01-23 PROCEDURE — 4004F PT TOBACCO SCREEN RCVD TLK: CPT | Performed by: INTERNAL MEDICINE

## 2023-01-23 PROCEDURE — 3078F DIAST BP <80 MM HG: CPT | Performed by: INTERNAL MEDICINE

## 2023-01-23 PROCEDURE — G8484 FLU IMMUNIZE NO ADMIN: HCPCS | Performed by: INTERNAL MEDICINE

## 2023-01-23 PROCEDURE — 3017F COLORECTAL CA SCREEN DOC REV: CPT | Performed by: INTERNAL MEDICINE

## 2023-01-23 PROCEDURE — G8427 DOCREV CUR MEDS BY ELIG CLIN: HCPCS | Performed by: INTERNAL MEDICINE

## 2023-01-23 PROCEDURE — G8400 PT W/DXA NO RESULTS DOC: HCPCS | Performed by: INTERNAL MEDICINE

## 2023-01-23 RX ORDER — CARVEDILOL 12.5 MG/1
12.5 TABLET ORAL 2 TIMES DAILY WITH MEALS
Qty: 180 TABLET | Refills: 1 | Status: SHIPPED | OUTPATIENT
Start: 2023-01-23

## 2023-01-23 RX ORDER — LOSARTAN POTASSIUM 25 MG/1
37.5 TABLET ORAL DAILY
Qty: 135 TABLET | Refills: 1 | Status: SHIPPED | OUTPATIENT
Start: 2023-01-23 | End: 2023-04-23

## 2023-01-23 RX ORDER — CARVEDILOL 12.5 MG/1
TABLET ORAL
Qty: 180 TABLET | Refills: 1 | OUTPATIENT
Start: 2023-01-23

## 2023-01-23 RX ORDER — UBIDECARENONE 50 MG
600 CAPSULE ORAL DAILY
COMMUNITY
Start: 2023-01-23

## 2023-01-23 ASSESSMENT — PATIENT HEALTH QUESTIONNAIRE - PHQ9
2. FEELING DOWN, DEPRESSED OR HOPELESS: 0
SUM OF ALL RESPONSES TO PHQ QUESTIONS 1-9: 0
SUM OF ALL RESPONSES TO PHQ QUESTIONS 1-9: 0
SUM OF ALL RESPONSES TO PHQ9 QUESTIONS 1 & 2: 0
1. LITTLE INTEREST OR PLEASURE IN DOING THINGS: 0
SUM OF ALL RESPONSES TO PHQ QUESTIONS 1-9: 0
SUM OF ALL RESPONSES TO PHQ QUESTIONS 1-9: 0

## 2023-01-23 NOTE — PROGRESS NOTES
Dawson Miller  1955 01/23/23    SUBJECTIVE:    No recurring sx cp, had benign stress cardiolite and echo 10/2021. Hypertension: Stable. Denies CP, SOB, cough, visual changes, dizziness, palpitations or HA. Continues to smoke and does not want to quit at this time. Not tried nicorette gum so advised to do so to wean, only at 5cig/day. VIT D, LOW BEFORE AND NOW ON SUPPLEMENT. Lipids:  Has history of high cholesterol, not on medication but trying to continue regular low fat diet and maintenance of weight, regular exercise. Intol of statins even once/week. WE DISCUSSED TRYING RYR LOW DOSE DAILY THEN CHECK FASTING LAB ONE MONTH    Ifg, ALSO WE'LL SCR A1C. DISCUSSED HER BORDERLINE HIGH PREV GLC READINGS AND NL RANGE BEING <100. OBJECTIVE:    /78   Pulse 70   Resp 14   Wt 162 lb (73.5 kg)   SpO2 98%   BMI 28.70 kg/m²     Physical Exam  Constitutional:       Appearance: Normal appearance. HENT:      Head: Normocephalic and atraumatic. Eyes:      Extraocular Movements: Extraocular movements intact. Conjunctiva/sclera: Conjunctivae normal.      Pupils: Pupils are equal, round, and reactive to light. Cardiovascular:      Rate and Rhythm: Normal rate and regular rhythm. Heart sounds: Normal heart sounds. No murmur heard. Pulmonary:      Effort: Pulmonary effort is normal. No respiratory distress. Breath sounds: Normal breath sounds. Abdominal:      General: Abdomen is flat. Bowel sounds are normal. There is no distension. Palpations: Abdomen is soft. There is no mass. Tenderness: There is no abdominal tenderness. Hernia: No hernia is present. Musculoskeletal:      Cervical back: Neck supple. Right lower leg: No edema. Left lower leg: No edema. Neurological:      Mental Status: She is alert. Psychiatric:         Mood and Affect: Mood normal.       ASSESSMENT:    1. Primary hypertension    2. Statin intolerance    3.  Vitamin D deficiency    4. Tobacco dependence    5. Mixed hyperlipidemia    6. Hyperglycemia        PLAN:    Leon Wall was seen today for hypertension. Diagnoses and all orders for this visit:    Primary hypertension - Blood pressure stable and will continue current regimen. Will plan periodic monitoring of renal function, electrolytes, lipid profile. -     carvedilol (COREG) 12.5 MG tablet; Take 1 tablet by mouth 2 times daily (with meals)  -     losartan (COZAAR) 25 MG tablet; Take 1.5 tablets by mouth daily  -     Comprehensive Metabolic Panel; Future  -     CBC with Auto Differential; Future  -     Lipid Panel; Future    Statin intolerance- TRYING ALT RYR    Vitamin D deficiency- CONT SUPPLEMENT AND F/U LEVELS  -     Vitamin D 25 Hydroxy; Future    Tobacco dependence -  Strongly advised smoking cessation and encouraged to keep working on cutting down use as much as possible. Mixed hyperlipidemia  - Pt will continue to work on a low fat diet and also exercise, wt loss as appropriate. Will continue periodic monitoring of fasting lipid profile, glucose, liver function. -     Comprehensive Metabolic Panel; Future  -     CBC with Auto Differential; Future  -     Lipid Panel; Future  -     Red Yeast Rice 600 MG TABS; Take 600 mg by mouth daily    Hyperglycemia - will continue to monitor fasting labs/glc for any progression to DM.   Patient will continue to try to work on diet modification.    -     Hemoglobin A1C; Future

## 2023-03-31 ENCOUNTER — OFFICE VISIT (OUTPATIENT)
Dept: INTERNAL MEDICINE CLINIC | Age: 68
End: 2023-03-31

## 2023-03-31 VITALS
DIASTOLIC BLOOD PRESSURE: 64 MMHG | HEART RATE: 76 BPM | HEIGHT: 63 IN | BODY MASS INDEX: 28.31 KG/M2 | OXYGEN SATURATION: 97 % | RESPIRATION RATE: 18 BRPM | SYSTOLIC BLOOD PRESSURE: 110 MMHG | WEIGHT: 159.8 LBS

## 2023-03-31 DIAGNOSIS — Z20.818 EXPOSURE TO STREPTOCOCCAL PHARYNGITIS: Primary | ICD-10-CM

## 2023-03-31 RX ORDER — AMOXICILLIN 500 MG/1
500 CAPSULE ORAL 3 TIMES DAILY
Qty: 21 CAPSULE | Refills: 0 | Status: SHIPPED | OUTPATIENT
Start: 2023-03-31 | End: 2023-04-07

## 2023-03-31 SDOH — ECONOMIC STABILITY: FOOD INSECURITY: WITHIN THE PAST 12 MONTHS, YOU WORRIED THAT YOUR FOOD WOULD RUN OUT BEFORE YOU GOT MONEY TO BUY MORE.: NEVER TRUE

## 2023-03-31 SDOH — ECONOMIC STABILITY: HOUSING INSECURITY
IN THE LAST 12 MONTHS, WAS THERE A TIME WHEN YOU DID NOT HAVE A STEADY PLACE TO SLEEP OR SLEPT IN A SHELTER (INCLUDING NOW)?: NO

## 2023-03-31 SDOH — ECONOMIC STABILITY: FOOD INSECURITY: WITHIN THE PAST 12 MONTHS, THE FOOD YOU BOUGHT JUST DIDN'T LAST AND YOU DIDN'T HAVE MONEY TO GET MORE.: NEVER TRUE

## 2023-03-31 SDOH — ECONOMIC STABILITY: INCOME INSECURITY: HOW HARD IS IT FOR YOU TO PAY FOR THE VERY BASICS LIKE FOOD, HOUSING, MEDICAL CARE, AND HEATING?: NOT HARD AT ALL

## 2023-03-31 ASSESSMENT — ENCOUNTER SYMPTOMS
SORE THROAT: 1
DIARRHEA: 0
SINUS PAIN: 0
SINUS PRESSURE: 0
VOMITING: 0
ABDOMINAL PAIN: 0
SHORTNESS OF BREATH: 0
CHEST TIGHTNESS: 0
COLOR CHANGE: 0
NAUSEA: 0
APNEA: 0
COUGH: 1

## 2023-03-31 NOTE — PROGRESS NOTES
Mark Divine  1955 03/31/23    Chief Complaint   Patient presents with    Pharyngitis     Whiteness on tongue sx started yesterday Pt grandchild was dx with strep        SUBJECTIVE:      Patient states that her grandson was recently dx with strep throat on Monday and over the last 2-3 days she has had worsening pharyngitis, pain with swallowing, PND. Denies any fevers or chills. COVID testing was negative this morning at home. Denies wheezing, cough, SOB. Review of Systems   Constitutional:  Negative for activity change, appetite change, fatigue and fever. HENT:  Positive for postnasal drip and sore throat. Negative for congestion, nosebleeds, sinus pressure and sinus pain. Respiratory:  Positive for cough. Negative for apnea, chest tightness and shortness of breath. Cardiovascular:  Negative for chest pain and palpitations. Gastrointestinal:  Negative for abdominal pain, diarrhea, nausea and vomiting. Genitourinary:  Negative for difficulty urinating, flank pain and hematuria. Musculoskeletal:  Negative for arthralgias, joint swelling and myalgias. Skin:  Negative for color change and rash. Neurological:  Negative for dizziness, light-headedness and headaches. Psychiatric/Behavioral: Negative. Negative for behavioral problems. OBJECTIVE:    /64   Pulse 76   Resp 18   Ht 5' 3\" (1.6 m)   Wt 159 lb 12.8 oz (72.5 kg)   SpO2 97%   BMI 28.31 kg/m²     Physical Exam  Constitutional:       General: She is not in acute distress. Appearance: She is well-developed. She is not diaphoretic. HENT:      Head: Normocephalic and atraumatic. Mouth/Throat:      Pharynx: Posterior oropharyngeal erythema present. No oropharyngeal exudate. Cardiovascular:      Rate and Rhythm: Normal rate and regular rhythm. Pulmonary:      Effort: Pulmonary effort is normal. No respiratory distress. Breath sounds: Normal breath sounds.    Abdominal:      General: Bowel sounds are

## 2023-04-17 DIAGNOSIS — Z20.818 EXPOSURE TO STREPTOCOCCAL PHARYNGITIS: Primary | ICD-10-CM

## 2023-04-17 RX ORDER — DOXYCYCLINE HYCLATE 100 MG
100 TABLET ORAL 2 TIMES DAILY
Qty: 20 TABLET | Refills: 0 | Status: SHIPPED | OUTPATIENT
Start: 2023-04-17 | End: 2023-04-27

## 2023-07-22 DIAGNOSIS — I10 PRIMARY HYPERTENSION: ICD-10-CM

## 2023-07-24 RX ORDER — CARVEDILOL 12.5 MG/1
TABLET ORAL
Qty: 180 TABLET | Refills: 1 | OUTPATIENT
Start: 2023-07-24

## 2023-07-25 ENCOUNTER — OFFICE VISIT (OUTPATIENT)
Dept: INTERNAL MEDICINE CLINIC | Age: 68
End: 2023-07-25
Payer: MEDICARE

## 2023-07-25 VITALS
SYSTOLIC BLOOD PRESSURE: 126 MMHG | WEIGHT: 159 LBS | HEIGHT: 64 IN | HEART RATE: 68 BPM | DIASTOLIC BLOOD PRESSURE: 78 MMHG | RESPIRATION RATE: 14 BRPM | OXYGEN SATURATION: 97 % | BODY MASS INDEX: 27.14 KG/M2

## 2023-07-25 DIAGNOSIS — E55.9 VITAMIN D DEFICIENCY: ICD-10-CM

## 2023-07-25 DIAGNOSIS — Z12.12 SCREENING FOR COLORECTAL CANCER: ICD-10-CM

## 2023-07-25 DIAGNOSIS — E78.2 MIXED HYPERLIPIDEMIA: ICD-10-CM

## 2023-07-25 DIAGNOSIS — F17.200 TOBACCO DEPENDENCE: ICD-10-CM

## 2023-07-25 DIAGNOSIS — I10 PRIMARY HYPERTENSION: ICD-10-CM

## 2023-07-25 DIAGNOSIS — Z00.00 INITIAL MEDICARE ANNUAL WELLNESS VISIT: ICD-10-CM

## 2023-07-25 DIAGNOSIS — Z12.11 SCREENING FOR COLORECTAL CANCER: ICD-10-CM

## 2023-07-25 DIAGNOSIS — M81.0 AGE-RELATED OSTEOPOROSIS WITHOUT CURRENT PATHOLOGICAL FRACTURE: Primary | ICD-10-CM

## 2023-07-25 DIAGNOSIS — M81.0 AGE-RELATED OSTEOPOROSIS WITHOUT CURRENT PATHOLOGICAL FRACTURE: ICD-10-CM

## 2023-07-25 DIAGNOSIS — Z00.00 ROUTINE GENERAL MEDICAL EXAMINATION AT A HEALTH CARE FACILITY: Primary | ICD-10-CM

## 2023-07-25 DIAGNOSIS — Z12.31 VISIT FOR SCREENING MAMMOGRAM: ICD-10-CM

## 2023-07-25 LAB
DEPRECATED RDW RBC AUTO: 13.6 % (ref 12.4–15.4)
HCT VFR BLD AUTO: 42.4 % (ref 36–48)
HGB BLD-MCNC: 14.6 G/DL (ref 12–16)
MCH RBC QN AUTO: 32.3 PG (ref 26–34)
MCHC RBC AUTO-ENTMCNC: 34.4 G/DL (ref 31–36)
MCV RBC AUTO: 93.9 FL (ref 80–100)
PLATELET # BLD AUTO: 261 K/UL (ref 135–450)
PMV BLD AUTO: 7.8 FL (ref 5–10.5)
RBC # BLD AUTO: 4.51 M/UL (ref 4–5.2)
TSH SERPL DL<=0.005 MIU/L-ACNC: 1.23 UIU/ML (ref 0.27–4.2)
WBC # BLD AUTO: 5.3 K/UL (ref 4–11)

## 2023-07-25 PROCEDURE — 36415 COLL VENOUS BLD VENIPUNCTURE: CPT | Performed by: INTERNAL MEDICINE

## 2023-07-25 PROCEDURE — 3074F SYST BP LT 130 MM HG: CPT | Performed by: INTERNAL MEDICINE

## 2023-07-25 PROCEDURE — 3078F DIAST BP <80 MM HG: CPT | Performed by: INTERNAL MEDICINE

## 2023-07-25 PROCEDURE — 3017F COLORECTAL CA SCREEN DOC REV: CPT | Performed by: INTERNAL MEDICINE

## 2023-07-25 PROCEDURE — G0439 PPPS, SUBSEQ VISIT: HCPCS | Performed by: INTERNAL MEDICINE

## 2023-07-25 PROCEDURE — 1123F ACP DISCUSS/DSCN MKR DOCD: CPT | Performed by: INTERNAL MEDICINE

## 2023-07-25 RX ORDER — LOSARTAN POTASSIUM 25 MG/1
37.5 TABLET ORAL DAILY
Qty: 135 TABLET | Refills: 1 | Status: SHIPPED | OUTPATIENT
Start: 2023-07-25 | End: 2024-01-21

## 2023-07-25 RX ORDER — CARVEDILOL 12.5 MG/1
12.5 TABLET ORAL 2 TIMES DAILY WITH MEALS
Qty: 180 TABLET | Refills: 1 | Status: SHIPPED | OUTPATIENT
Start: 2023-07-25

## 2023-07-25 ASSESSMENT — LIFESTYLE VARIABLES
HOW OFTEN DO YOU HAVE A DRINK CONTAINING ALCOHOL: NEVER
HOW MANY STANDARD DRINKS CONTAINING ALCOHOL DO YOU HAVE ON A TYPICAL DAY: PATIENT DOES NOT DRINK

## 2023-07-25 ASSESSMENT — PATIENT HEALTH QUESTIONNAIRE - PHQ9
SUM OF ALL RESPONSES TO PHQ9 QUESTIONS 1 & 2: 0
SUM OF ALL RESPONSES TO PHQ QUESTIONS 1-9: 0
2. FEELING DOWN, DEPRESSED OR HOPELESS: 0
SUM OF ALL RESPONSES TO PHQ QUESTIONS 1-9: 0
1. LITTLE INTEREST OR PLEASURE IN DOING THINGS: 0

## 2023-07-25 NOTE — PROGRESS NOTES
auscultation bilaterally- no wheezes, rales or rhonchi, normal air movement, no respiratory distress  Cardiovascular: normal rate, regular rhythm, normal S1 and S2, no murmurs, rubs, clicks, or gallops, distal pulses intact, no carotid bruits  Abdomen: soft, non-tender, non-distended, normal bowel sounds, no masses or organomegaly  Extremities: no cyanosis, clubbing or edema  Musculoskeletal: normal range of motion, no joint swelling, deformity or tenderness  Neurologic:no cranial nerve deficit, gait, coordination and speech normal       Allergies   Allergen Reactions    Codeine Nausea And Vomiting    Crestor [Rosuvastatin Calcium]      myalgias    Influenza Virus Vaccine      Gets sick    Lipitor      Elevated alk phos    Statins      Elevated LFTs    Valtrex [Valacyclovir]      Felt drunk, slurred speech     Prior to Visit Medications    Medication Sig Taking?  Authorizing Provider   carvedilol (COREG) 12.5 MG tablet Take 1 tablet by mouth 2 times daily (with meals) Yes Severiano Diaz MD   Red Yeast Rice 600 MG TABS Take 600 mg by mouth daily Yes Severiano Diaz MD   vitamin D3 (CHOLECALCIFEROL) 25 MCG (1000 UT) TABS tablet Take 1 tablet by mouth daily Yes Severiano Diaz MD   aspirin 81 MG chewable tablet Take 1 tablet by mouth daily Yes Sergio Rivero, APRN - CNP   losartan (COZAAR) 25 MG tablet Take 1.5 tablets by mouth daily  Severiano Diaz MD       [facility] (Including outside providers/suppliers regularly involved in providing care):   Patient Care Team:  Severiano Diaz MD as PCP - Rosa Garcia MD as PCP - Empaneled Provider     Reviewed and updated this visit:  Tobacco  Allergies  Meds  Problems  Med Hx  Surg Hx  Soc Hx  Fam Hx

## 2023-07-26 DIAGNOSIS — R73.9 HYPERGLYCEMIA: Primary | ICD-10-CM

## 2023-07-26 DIAGNOSIS — R73.9 HYPERGLYCEMIA: ICD-10-CM

## 2023-07-26 PROBLEM — R73.01 IFG (IMPAIRED FASTING GLUCOSE): Status: ACTIVE | Noted: 2023-07-26

## 2023-07-26 LAB
25(OH)D3 SERPL-MCNC: 22.7 NG/ML
ALBUMIN SERPL-MCNC: 4.6 G/DL (ref 3.4–5)
ALBUMIN/GLOB SERPL: 2 {RATIO} (ref 1.1–2.2)
ALP SERPL-CCNC: 137 U/L (ref 40–129)
ALT SERPL-CCNC: 22 U/L (ref 10–40)
ANION GAP SERPL CALCULATED.3IONS-SCNC: 14 MMOL/L (ref 3–16)
AST SERPL-CCNC: 19 U/L (ref 15–37)
BILIRUB SERPL-MCNC: 0.9 MG/DL (ref 0–1)
BUN SERPL-MCNC: 13 MG/DL (ref 7–20)
CALCIUM SERPL-MCNC: 10.2 MG/DL (ref 8.3–10.6)
CHLORIDE SERPL-SCNC: 104 MMOL/L (ref 99–110)
CHOLEST SERPL-MCNC: 258 MG/DL (ref 0–199)
CO2 SERPL-SCNC: 24 MMOL/L (ref 21–32)
CREAT SERPL-MCNC: 1 MG/DL (ref 0.6–1.2)
GFR SERPLBLD CREATININE-BSD FMLA CKD-EPI: >60 ML/MIN/{1.73_M2}
GLUCOSE SERPL-MCNC: 117 MG/DL (ref 70–99)
HDLC SERPL-MCNC: 50 MG/DL (ref 40–60)
LDLC SERPL CALC-MCNC: 162 MG/DL
POTASSIUM SERPL-SCNC: 4.5 MMOL/L (ref 3.5–5.1)
PROT SERPL-MCNC: 6.9 G/DL (ref 6.4–8.2)
SODIUM SERPL-SCNC: 142 MMOL/L (ref 136–145)
TRIGL SERPL-MCNC: 231 MG/DL (ref 0–150)
VLDLC SERPL CALC-MCNC: 46 MG/DL

## 2023-07-26 NOTE — RESULT ENCOUNTER NOTE
Please call pt, chol is improved from 270-258- check if is on the red yeast rice? Pls cont this and low fat diet. Sugar sl higher indicating may have some worsening of preDM, advise to keep working on low carb diet, keep trying to limit her coca cola if possible. ADD HGB A1C TOO, DX HYPERGLYCEMIA.     LASTLY, VIT D IS LOW SO IF NOT ON VIT D3, CONT THIS 1000 UNITS REGULARLY BUT IF ALREADY ON 1000 UNITS/DAY, INCR TO 2000 UNITS/DAY NOW

## 2023-07-27 LAB
EST. AVERAGE GLUCOSE BLD GHB EST-MCNC: 108.3 MG/DL
HBA1C MFR BLD: 5.4 %

## 2023-07-28 ENCOUNTER — OFFICE VISIT (OUTPATIENT)
Dept: INTERNAL MEDICINE CLINIC | Age: 68
End: 2023-07-28
Payer: MEDICARE

## 2023-07-28 VITALS
OXYGEN SATURATION: 99 % | WEIGHT: 156 LBS | HEART RATE: 63 BPM | DIASTOLIC BLOOD PRESSURE: 62 MMHG | BODY MASS INDEX: 26.78 KG/M2 | SYSTOLIC BLOOD PRESSURE: 104 MMHG | RESPIRATION RATE: 18 BRPM

## 2023-07-28 DIAGNOSIS — J01.00 ACUTE NON-RECURRENT MAXILLARY SINUSITIS: Primary | ICD-10-CM

## 2023-07-28 PROCEDURE — G8417 CALC BMI ABV UP PARAM F/U: HCPCS | Performed by: INTERNAL MEDICINE

## 2023-07-28 PROCEDURE — 99213 OFFICE O/P EST LOW 20 MIN: CPT | Performed by: INTERNAL MEDICINE

## 2023-07-28 PROCEDURE — 3078F DIAST BP <80 MM HG: CPT | Performed by: INTERNAL MEDICINE

## 2023-07-28 PROCEDURE — 4004F PT TOBACCO SCREEN RCVD TLK: CPT | Performed by: INTERNAL MEDICINE

## 2023-07-28 PROCEDURE — 3017F COLORECTAL CA SCREEN DOC REV: CPT | Performed by: INTERNAL MEDICINE

## 2023-07-28 PROCEDURE — 1123F ACP DISCUSS/DSCN MKR DOCD: CPT | Performed by: INTERNAL MEDICINE

## 2023-07-28 PROCEDURE — 3074F SYST BP LT 130 MM HG: CPT | Performed by: INTERNAL MEDICINE

## 2023-07-28 PROCEDURE — 1090F PRES/ABSN URINE INCON ASSESS: CPT | Performed by: INTERNAL MEDICINE

## 2023-07-28 PROCEDURE — G8427 DOCREV CUR MEDS BY ELIG CLIN: HCPCS | Performed by: INTERNAL MEDICINE

## 2023-07-28 PROCEDURE — G8400 PT W/DXA NO RESULTS DOC: HCPCS | Performed by: INTERNAL MEDICINE

## 2023-07-28 RX ORDER — PREDNISONE 5 MG/1
TABLET ORAL
Qty: 36 TABLET | Refills: 0 | Status: SHIPPED | OUTPATIENT
Start: 2023-07-28

## 2023-07-28 RX ORDER — AMOXICILLIN 500 MG/1
500 CAPSULE ORAL 2 TIMES DAILY
Qty: 20 CAPSULE | Refills: 0 | Status: SHIPPED | OUTPATIENT
Start: 2023-07-28 | End: 2023-08-07

## 2023-09-21 ENCOUNTER — OFFICE VISIT (OUTPATIENT)
Dept: INTERNAL MEDICINE CLINIC | Age: 68
End: 2023-09-21
Payer: MEDICARE

## 2023-09-21 VITALS
DIASTOLIC BLOOD PRESSURE: 72 MMHG | OXYGEN SATURATION: 99 % | HEART RATE: 63 BPM | RESPIRATION RATE: 18 BRPM | SYSTOLIC BLOOD PRESSURE: 114 MMHG | WEIGHT: 150 LBS | HEIGHT: 64 IN | BODY MASS INDEX: 25.61 KG/M2

## 2023-09-21 DIAGNOSIS — L24.7 CONTACT DERMATITIS AND ECZEMA DUE TO PLANT: Primary | ICD-10-CM

## 2023-09-21 PROCEDURE — 4004F PT TOBACCO SCREEN RCVD TLK: CPT | Performed by: NURSE PRACTITIONER

## 2023-09-21 PROCEDURE — G8417 CALC BMI ABV UP PARAM F/U: HCPCS | Performed by: NURSE PRACTITIONER

## 2023-09-21 PROCEDURE — 3017F COLORECTAL CA SCREEN DOC REV: CPT | Performed by: NURSE PRACTITIONER

## 2023-09-21 PROCEDURE — 3078F DIAST BP <80 MM HG: CPT | Performed by: NURSE PRACTITIONER

## 2023-09-21 PROCEDURE — 1123F ACP DISCUSS/DSCN MKR DOCD: CPT | Performed by: NURSE PRACTITIONER

## 2023-09-21 PROCEDURE — G8427 DOCREV CUR MEDS BY ELIG CLIN: HCPCS | Performed by: NURSE PRACTITIONER

## 2023-09-21 PROCEDURE — 3074F SYST BP LT 130 MM HG: CPT | Performed by: NURSE PRACTITIONER

## 2023-09-21 PROCEDURE — G8400 PT W/DXA NO RESULTS DOC: HCPCS | Performed by: NURSE PRACTITIONER

## 2023-09-21 PROCEDURE — 99213 OFFICE O/P EST LOW 20 MIN: CPT | Performed by: NURSE PRACTITIONER

## 2023-09-21 PROCEDURE — 1090F PRES/ABSN URINE INCON ASSESS: CPT | Performed by: NURSE PRACTITIONER

## 2023-09-21 RX ORDER — HYDROXYZINE HYDROCHLORIDE 25 MG/1
25 TABLET, FILM COATED ORAL EVERY 8 HOURS PRN
Qty: 30 TABLET | Refills: 0 | Status: SHIPPED | OUTPATIENT
Start: 2023-09-21 | End: 2023-10-01

## 2023-09-21 RX ORDER — PREDNISONE 10 MG/1
TABLET ORAL
Qty: 20 TABLET | Refills: 0 | Status: SHIPPED | OUTPATIENT
Start: 2023-09-21

## 2023-09-21 ASSESSMENT — ENCOUNTER SYMPTOMS
COLOR CHANGE: 0
SINUS PRESSURE: 0
CHEST TIGHTNESS: 0
VOMITING: 0
NAUSEA: 0
DIARRHEA: 0
SINUS PAIN: 0
SHORTNESS OF BREATH: 0
APNEA: 0
ABDOMINAL PAIN: 0
COUGH: 0

## 2023-11-09 ENCOUNTER — OFFICE VISIT (OUTPATIENT)
Dept: INTERNAL MEDICINE CLINIC | Age: 68
End: 2023-11-09
Payer: MEDICARE

## 2023-11-09 VITALS
BODY MASS INDEX: 27.39 KG/M2 | HEIGHT: 64 IN | OXYGEN SATURATION: 99 % | RESPIRATION RATE: 22 BRPM | DIASTOLIC BLOOD PRESSURE: 84 MMHG | HEART RATE: 70 BPM | SYSTOLIC BLOOD PRESSURE: 126 MMHG | WEIGHT: 160.4 LBS

## 2023-11-09 DIAGNOSIS — R05.1 ACUTE COUGH: Primary | ICD-10-CM

## 2023-11-09 PROCEDURE — 3074F SYST BP LT 130 MM HG: CPT | Performed by: NURSE PRACTITIONER

## 2023-11-09 PROCEDURE — 1090F PRES/ABSN URINE INCON ASSESS: CPT | Performed by: NURSE PRACTITIONER

## 2023-11-09 PROCEDURE — G8400 PT W/DXA NO RESULTS DOC: HCPCS | Performed by: NURSE PRACTITIONER

## 2023-11-09 PROCEDURE — 1123F ACP DISCUSS/DSCN MKR DOCD: CPT | Performed by: NURSE PRACTITIONER

## 2023-11-09 PROCEDURE — 99213 OFFICE O/P EST LOW 20 MIN: CPT | Performed by: NURSE PRACTITIONER

## 2023-11-09 PROCEDURE — 3017F COLORECTAL CA SCREEN DOC REV: CPT | Performed by: NURSE PRACTITIONER

## 2023-11-09 PROCEDURE — G8417 CALC BMI ABV UP PARAM F/U: HCPCS | Performed by: NURSE PRACTITIONER

## 2023-11-09 PROCEDURE — G8484 FLU IMMUNIZE NO ADMIN: HCPCS | Performed by: NURSE PRACTITIONER

## 2023-11-09 PROCEDURE — G8427 DOCREV CUR MEDS BY ELIG CLIN: HCPCS | Performed by: NURSE PRACTITIONER

## 2023-11-09 PROCEDURE — 3079F DIAST BP 80-89 MM HG: CPT | Performed by: NURSE PRACTITIONER

## 2023-11-09 PROCEDURE — 4004F PT TOBACCO SCREEN RCVD TLK: CPT | Performed by: NURSE PRACTITIONER

## 2023-11-09 RX ORDER — PREDNISONE 10 MG/1
TABLET ORAL
Qty: 20 TABLET | Refills: 0 | Status: SHIPPED | OUTPATIENT
Start: 2023-11-09

## 2023-11-09 RX ORDER — LEVOFLOXACIN 500 MG/1
500 TABLET, FILM COATED ORAL DAILY
Qty: 10 TABLET | Refills: 0 | Status: SHIPPED | OUTPATIENT
Start: 2023-11-09 | End: 2023-11-19

## 2023-11-09 ASSESSMENT — ENCOUNTER SYMPTOMS
COLOR CHANGE: 0
SINUS PRESSURE: 1
VOMITING: 0
DIARRHEA: 0
ABDOMINAL PAIN: 0
NAUSEA: 0
COUGH: 1
CHEST TIGHTNESS: 0
SINUS PAIN: 1
APNEA: 0
BACK PAIN: 1
SHORTNESS OF BREATH: 0

## 2023-11-09 NOTE — PROGRESS NOTES
oropharyngeal exudate. Cardiovascular:      Rate and Rhythm: Normal rate and regular rhythm. Pulmonary:      Effort: Pulmonary effort is normal. No respiratory distress. Breath sounds: Examination of the right-lower field reveals decreased breath sounds. Examination of the left-lower field reveals decreased breath sounds. Decreased breath sounds present. No wheezing. Abdominal:      General: Bowel sounds are normal.      Palpations: Abdomen is soft. Tenderness: There is no abdominal tenderness. Musculoskeletal:         General: Normal range of motion. Cervical back: Normal range of motion and neck supple. No edema or erythema. No muscular tenderness. Skin:     General: Skin is warm and dry. Capillary Refill: Capillary refill takes less than 2 seconds. Neurological:      Mental Status: She is alert and oriented to person, place, and time. Coordination: Coordination normal.   Psychiatric:         Behavior: Behavior normal.         Thought Content: Thought content normal.         Judgment: Judgment normal.         ASSESSMENT:    1. Acute cough        PLAN:    Luz was seen today for cough. Diagnoses and all orders for this visit:    Acute cough- exam concerning for developing pneumonia. Treat aggressively with Levaquin/pred taper. If no improvement by Monday, call for CXR. -     levoFLOXacin (LEVAQUIN) 500 MG tablet; Take 1 tablet by mouth daily for 10 days  -     predniSONE (DELTASONE) 10 MG tablet; Take 4 tablets daily for 2 days, then 3 tablets for 2 days, 2 tablets for 2 days, then 1 tablet for 2 days             No data to display                No follow-ups on file. Christos note this reports has been produced speech recognition software and may contain errors related to that system including errors in grammar, punctuation, and spelling, as well as words and phrases that may be appropriate.  If there are any questions or concerns please feel free to contact the dictating

## 2023-11-22 ENCOUNTER — OFFICE VISIT (OUTPATIENT)
Dept: INTERNAL MEDICINE CLINIC | Age: 68
End: 2023-11-22
Payer: MEDICARE

## 2023-11-22 VITALS
BODY MASS INDEX: 27.14 KG/M2 | SYSTOLIC BLOOD PRESSURE: 124 MMHG | OXYGEN SATURATION: 98 % | HEART RATE: 67 BPM | DIASTOLIC BLOOD PRESSURE: 72 MMHG | RESPIRATION RATE: 16 BRPM | HEIGHT: 64 IN | WEIGHT: 159 LBS

## 2023-11-22 DIAGNOSIS — M54.12 CERVICAL RADICULOPATHY: Primary | ICD-10-CM

## 2023-11-22 PROCEDURE — 1090F PRES/ABSN URINE INCON ASSESS: CPT | Performed by: INTERNAL MEDICINE

## 2023-11-22 PROCEDURE — G8484 FLU IMMUNIZE NO ADMIN: HCPCS | Performed by: INTERNAL MEDICINE

## 2023-11-22 PROCEDURE — G8400 PT W/DXA NO RESULTS DOC: HCPCS | Performed by: INTERNAL MEDICINE

## 2023-11-22 PROCEDURE — 3074F SYST BP LT 130 MM HG: CPT | Performed by: INTERNAL MEDICINE

## 2023-11-22 PROCEDURE — G8427 DOCREV CUR MEDS BY ELIG CLIN: HCPCS | Performed by: INTERNAL MEDICINE

## 2023-11-22 PROCEDURE — 3078F DIAST BP <80 MM HG: CPT | Performed by: INTERNAL MEDICINE

## 2023-11-22 PROCEDURE — 1123F ACP DISCUSS/DSCN MKR DOCD: CPT | Performed by: INTERNAL MEDICINE

## 2023-11-22 PROCEDURE — 4004F PT TOBACCO SCREEN RCVD TLK: CPT | Performed by: INTERNAL MEDICINE

## 2023-11-22 PROCEDURE — 99213 OFFICE O/P EST LOW 20 MIN: CPT | Performed by: INTERNAL MEDICINE

## 2023-11-22 PROCEDURE — G8417 CALC BMI ABV UP PARAM F/U: HCPCS | Performed by: INTERNAL MEDICINE

## 2023-11-22 PROCEDURE — 3017F COLORECTAL CA SCREEN DOC REV: CPT | Performed by: INTERNAL MEDICINE

## 2023-11-22 RX ORDER — TIZANIDINE 2 MG/1
2 TABLET ORAL 3 TIMES DAILY PRN
Qty: 30 TABLET | Refills: 0 | Status: SHIPPED | OUTPATIENT
Start: 2023-11-22

## 2023-11-22 RX ORDER — PREDNISONE 5 MG/1
TABLET ORAL
Qty: 36 TABLET | Refills: 0 | Status: SHIPPED | OUTPATIENT
Start: 2023-11-22

## 2023-11-22 RX ORDER — HYDROCODONE BITARTRATE AND ACETAMINOPHEN 5; 325 MG/1; MG/1
1 TABLET ORAL EVERY 8 HOURS PRN
Qty: 21 TABLET | Refills: 0 | Status: SHIPPED | OUTPATIENT
Start: 2023-11-22 | End: 2023-11-29

## 2024-01-09 ENCOUNTER — PATIENT MESSAGE (OUTPATIENT)
Dept: INTERNAL MEDICINE CLINIC | Age: 69
End: 2024-01-09

## 2024-01-09 DIAGNOSIS — I10 PRIMARY HYPERTENSION: ICD-10-CM

## 2024-01-10 RX ORDER — CARVEDILOL 12.5 MG/1
12.5 TABLET ORAL 2 TIMES DAILY WITH MEALS
Qty: 180 TABLET | Refills: 1 | Status: SHIPPED | OUTPATIENT
Start: 2024-01-10

## 2024-01-10 RX ORDER — LOSARTAN POTASSIUM 25 MG/1
37.5 TABLET ORAL DAILY
Qty: 135 TABLET | Refills: 1 | Status: SHIPPED | OUTPATIENT
Start: 2024-01-10 | End: 2024-07-08

## 2024-01-10 NOTE — TELEPHONE ENCOUNTER
From: Luz Cervantes  To: Dr. Raul Hoff  Sent: 1/9/2024 6:12 PM EST  Subject: Blood Pressure Medicine    I don’t have enough blood pressure medicine to make it to my appointment on January 25. If I need to have my appointment sooner let me know if not please call them in to [facility]

## 2024-01-19 ENCOUNTER — OFFICE VISIT (OUTPATIENT)
Dept: INTERNAL MEDICINE CLINIC | Age: 69
End: 2024-01-19
Payer: MEDICARE

## 2024-01-19 VITALS
HEART RATE: 80 BPM | OXYGEN SATURATION: 95 % | WEIGHT: 160.8 LBS | HEIGHT: 63 IN | BODY MASS INDEX: 28.49 KG/M2 | DIASTOLIC BLOOD PRESSURE: 92 MMHG | SYSTOLIC BLOOD PRESSURE: 140 MMHG

## 2024-01-19 DIAGNOSIS — J01.00 ACUTE NON-RECURRENT MAXILLARY SINUSITIS: ICD-10-CM

## 2024-01-19 DIAGNOSIS — H10.33 ACUTE BACTERIAL CONJUNCTIVITIS OF BOTH EYES: Primary | ICD-10-CM

## 2024-01-19 DIAGNOSIS — I10 PRIMARY HYPERTENSION: ICD-10-CM

## 2024-01-19 PROCEDURE — 1090F PRES/ABSN URINE INCON ASSESS: CPT | Performed by: INTERNAL MEDICINE

## 2024-01-19 PROCEDURE — 3080F DIAST BP >= 90 MM HG: CPT | Performed by: INTERNAL MEDICINE

## 2024-01-19 PROCEDURE — G8484 FLU IMMUNIZE NO ADMIN: HCPCS | Performed by: INTERNAL MEDICINE

## 2024-01-19 PROCEDURE — 1123F ACP DISCUSS/DSCN MKR DOCD: CPT | Performed by: INTERNAL MEDICINE

## 2024-01-19 PROCEDURE — 3077F SYST BP >= 140 MM HG: CPT | Performed by: INTERNAL MEDICINE

## 2024-01-19 PROCEDURE — G8417 CALC BMI ABV UP PARAM F/U: HCPCS | Performed by: INTERNAL MEDICINE

## 2024-01-19 PROCEDURE — G8400 PT W/DXA NO RESULTS DOC: HCPCS | Performed by: INTERNAL MEDICINE

## 2024-01-19 PROCEDURE — G8427 DOCREV CUR MEDS BY ELIG CLIN: HCPCS | Performed by: INTERNAL MEDICINE

## 2024-01-19 PROCEDURE — 99213 OFFICE O/P EST LOW 20 MIN: CPT | Performed by: INTERNAL MEDICINE

## 2024-01-19 PROCEDURE — 4004F PT TOBACCO SCREEN RCVD TLK: CPT | Performed by: INTERNAL MEDICINE

## 2024-01-19 PROCEDURE — 3017F COLORECTAL CA SCREEN DOC REV: CPT | Performed by: INTERNAL MEDICINE

## 2024-01-19 RX ORDER — AMOXICILLIN 500 MG/1
500 CAPSULE ORAL 2 TIMES DAILY
Qty: 14 CAPSULE | Refills: 0 | Status: SHIPPED | OUTPATIENT
Start: 2024-01-19 | End: 2024-01-26

## 2024-01-19 RX ORDER — TOBRAMYCIN 3 MG/ML
1 SOLUTION/ DROPS OPHTHALMIC EVERY 6 HOURS
Qty: 1 EACH | Refills: 0 | Status: SHIPPED | OUTPATIENT
Start: 2024-01-19 | End: 2024-01-29

## 2024-01-19 RX ORDER — PREDNISONE 5 MG/1
TABLET ORAL
Qty: 21 TABLET | Refills: 0 | Status: SHIPPED | OUTPATIENT
Start: 2024-01-19

## 2024-01-19 ASSESSMENT — PATIENT HEALTH QUESTIONNAIRE - PHQ9
SUM OF ALL RESPONSES TO PHQ QUESTIONS 1-9: 0
1. LITTLE INTEREST OR PLEASURE IN DOING THINGS: 0
2. FEELING DOWN, DEPRESSED OR HOPELESS: 0
SUM OF ALL RESPONSES TO PHQ9 QUESTIONS 1 & 2: 0

## 2024-01-19 NOTE — PROGRESS NOTES
Luz Cervantes  1955 01/19/24    SUBJECTIVE:    The past two days w onset of sinus congestion and r >l sided redness, matting.  Has some sinus pressure. No fever, chills, cough.  Grandson recently ill.    Hypertension: TOOK ADVIL COLD/SINUS YESTERDAY AND BP SL HIGH. Denies CP, SOB, cough, visual changes, dizziness, palpitations or HA.            OBJECTIVE:    BP (!) 140/92 (Site: Right Upper Arm, Position: Sitting, Cuff Size: Medium Adult)   Pulse 80   Ht 1.6 m (5' 3\")   Wt 72.9 kg (160 lb 12.8 oz)   SpO2 95%   BMI 28.48 kg/m²     Physical Exam  Vitals reviewed.   Constitutional:       General: She is not in acute distress.     Appearance: She is well-developed.   HENT:      Head: Normocephalic and atraumatic.      Nose: Mucosal edema, congestion and rhinorrhea present. No nasal deformity.      Mouth/Throat:      Pharynx: Oropharynx is clear. No oropharyngeal exudate or posterior oropharyngeal erythema.   Eyes:      General: No scleral icterus.        Right eye: Discharge (R>L INJECTION.) present.         Left eye: Discharge present.     Conjunctiva/sclera: Conjunctivae normal.   Cardiovascular:      Rate and Rhythm: Normal rate and regular rhythm.      Heart sounds: Normal heart sounds. No murmur heard.  Pulmonary:      Effort: Pulmonary effort is normal. No respiratory distress.      Breath sounds: Normal breath sounds. No wheezing or rales.   Abdominal:      General: Bowel sounds are normal. There is no distension.      Palpations: Abdomen is soft.      Tenderness: There is no abdominal tenderness.   Musculoskeletal:      Cervical back: Neck supple.   Lymphadenopathy:      Cervical: No cervical adenopathy.         ASSESSMENT:    1. Acute bacterial conjunctivitis of both eyes    2. Acute non-recurrent maxillary sinusitis    3. Primary hypertension        PLAN:    Luz was seen today for follow-up.    Diagnoses and all orders for this visit:    Acute bacterial conjunctivitis of both eyes -  clinically c/w

## 2024-01-22 DIAGNOSIS — J01.00 ACUTE NON-RECURRENT MAXILLARY SINUSITIS: Primary | ICD-10-CM

## 2024-01-22 RX ORDER — LEVOFLOXACIN 500 MG/1
500 TABLET, FILM COATED ORAL DAILY
Qty: 10 TABLET | Refills: 0 | Status: SHIPPED | OUTPATIENT
Start: 2024-01-22 | End: 2024-02-01

## 2024-01-22 RX ORDER — METHYLPREDNISOLONE 4 MG/1
TABLET ORAL
Qty: 1 KIT | Refills: 0 | Status: SHIPPED | OUTPATIENT
Start: 2024-01-22 | End: 2024-01-28

## 2024-07-05 DIAGNOSIS — I10 PRIMARY HYPERTENSION: ICD-10-CM

## 2024-07-07 RX ORDER — CARVEDILOL 12.5 MG/1
TABLET ORAL
Qty: 180 TABLET | Refills: 1 | Status: SHIPPED | OUTPATIENT
Start: 2024-07-07

## 2024-07-18 SDOH — ECONOMIC STABILITY: INCOME INSECURITY: HOW HARD IS IT FOR YOU TO PAY FOR THE VERY BASICS LIKE FOOD, HOUSING, MEDICAL CARE, AND HEATING?: NOT VERY HARD

## 2024-07-18 SDOH — ECONOMIC STABILITY: FOOD INSECURITY: WITHIN THE PAST 12 MONTHS, THE FOOD YOU BOUGHT JUST DIDN'T LAST AND YOU DIDN'T HAVE MONEY TO GET MORE.: NEVER TRUE

## 2024-07-18 SDOH — ECONOMIC STABILITY: FOOD INSECURITY: WITHIN THE PAST 12 MONTHS, YOU WORRIED THAT YOUR FOOD WOULD RUN OUT BEFORE YOU GOT MONEY TO BUY MORE.: NEVER TRUE

## 2024-07-18 SDOH — ECONOMIC STABILITY: TRANSPORTATION INSECURITY
IN THE PAST 12 MONTHS, HAS LACK OF TRANSPORTATION KEPT YOU FROM MEETINGS, WORK, OR FROM GETTING THINGS NEEDED FOR DAILY LIVING?: NO

## 2024-07-19 ENCOUNTER — OFFICE VISIT (OUTPATIENT)
Dept: INTERNAL MEDICINE CLINIC | Age: 69
End: 2024-07-19

## 2024-07-19 VITALS
WEIGHT: 159.8 LBS | DIASTOLIC BLOOD PRESSURE: 60 MMHG | HEIGHT: 63 IN | HEART RATE: 62 BPM | BODY MASS INDEX: 28.31 KG/M2 | OXYGEN SATURATION: 97 % | SYSTOLIC BLOOD PRESSURE: 120 MMHG

## 2024-07-19 DIAGNOSIS — M81.0 AGE-RELATED OSTEOPOROSIS WITHOUT CURRENT PATHOLOGICAL FRACTURE: ICD-10-CM

## 2024-07-19 DIAGNOSIS — E55.9 VITAMIN D DEFICIENCY: ICD-10-CM

## 2024-07-19 DIAGNOSIS — I10 PRIMARY HYPERTENSION: Primary | ICD-10-CM

## 2024-07-19 DIAGNOSIS — F17.200 TOBACCO DEPENDENCE: ICD-10-CM

## 2024-07-19 DIAGNOSIS — R73.01 IFG (IMPAIRED FASTING GLUCOSE): ICD-10-CM

## 2024-07-19 DIAGNOSIS — Z12.31 VISIT FOR SCREENING MAMMOGRAM: ICD-10-CM

## 2024-07-19 DIAGNOSIS — E78.2 MIXED HYPERLIPIDEMIA: ICD-10-CM

## 2024-07-19 RX ORDER — TIZANIDINE 2 MG/1
2 TABLET ORAL 3 TIMES DAILY PRN
Qty: 30 TABLET | Refills: 0 | Status: CANCELLED | OUTPATIENT
Start: 2024-07-19

## 2024-07-19 RX ORDER — MELATONIN
1000 DAILY
Qty: 30 TABLET | Refills: 0 | Status: SHIPPED | OUTPATIENT
Start: 2024-07-19

## 2024-07-19 RX ORDER — CARVEDILOL 12.5 MG/1
TABLET ORAL
Qty: 180 TABLET | Refills: 1 | Status: SHIPPED | OUTPATIENT
Start: 2024-07-19

## 2024-07-19 RX ORDER — LOSARTAN POTASSIUM 25 MG/1
37.5 TABLET ORAL DAILY
Qty: 135 TABLET | Refills: 1 | Status: SHIPPED | OUTPATIENT
Start: 2024-07-19 | End: 2025-01-15

## 2024-07-19 RX ORDER — UBIDECARENONE 50 MG
600 CAPSULE ORAL DAILY
COMMUNITY
Start: 2024-07-19

## 2024-07-19 NOTE — PROGRESS NOTES
There is no abdominal tenderness. There is no guarding or rebound.   Musculoskeletal:      Cervical back: Neck supple.      Right lower leg: No edema.      Left lower leg: No edema.   Lymphadenopathy:      Cervical: No cervical adenopathy.   Skin:     General: Skin is warm and dry.   Neurological:      Mental Status: She is alert.   Psychiatric:         Mood and Affect: Mood normal.         ASSESSMENT:    1. Primary hypertension    2. Mixed hyperlipidemia    3. Visit for screening mammogram    4. Age-related osteoporosis without current pathological fracture    5. Vitamin D deficiency    6. Tobacco dependence    7. IFG (impaired fasting glucose)        PLAN:    Luz was seen today for 6 month follow-up.    Diagnoses and all orders for this visit:    Primary hypertension - Blood pressure stable and will continue current regimen.  Will plan periodic monitoring of renal function, electrolytes, lipid profile.     -     losartan (COZAAR) 25 MG tablet; Take 1.5 tablets by mouth daily  -     carvedilol (COREG) 12.5 MG tablet; TAKE 1 TABLET BY MOUTH TWICE A DAY WITH A MEAL  -     Comprehensive Metabolic Panel; Future  -     Lipid Panel; Future  -     CBC; Future    Mixed hyperlipidemia - Pt will continue to work on a low fat diet and also exercise, wt loss as appropriate.  Will continue periodic monitoring of fasting lipid profile, glucose, liver function.  Intol of statins  -     Red Yeast Rice 600 MG TABS; Take 600 mg by mouth daily  -     Comprehensive Metabolic Panel; Future  -     Lipid Panel; Future  -     TSH; Future    Visit for screening mammogram- scr due  -     ESTER DIGITAL SCREEN W CAD BILATERAL PER PROTOCOL; Future    Age-related osteoporosis without current pathological fracture-   -     Vitamin D 25 Hydroxy; Future  -     DEXA BONE DENSITY AXIAL SKELETON; Future    Vitamin D deficiency - f/u levels after back on supplement  -     Vitamin D 25 Hydroxy; Future    Tobacco dependence -  Strongly advised smoking

## 2024-09-19 ENCOUNTER — OFFICE VISIT (OUTPATIENT)
Dept: INTERNAL MEDICINE CLINIC | Age: 69
End: 2024-09-19

## 2024-09-19 VITALS
BODY MASS INDEX: 28.24 KG/M2 | SYSTOLIC BLOOD PRESSURE: 138 MMHG | DIASTOLIC BLOOD PRESSURE: 80 MMHG | OXYGEN SATURATION: 99 % | HEIGHT: 63 IN | HEART RATE: 66 BPM | WEIGHT: 159.4 LBS

## 2024-09-19 DIAGNOSIS — L23.7 POISON IVY DERMATITIS: Primary | ICD-10-CM

## 2024-09-19 RX ORDER — PREDNISONE 5 MG/1
TABLET ORAL
Qty: 36 TABLET | Refills: 0 | Status: SHIPPED | OUTPATIENT
Start: 2024-09-19

## 2024-09-19 RX ORDER — TRIAMCINOLONE ACETONIDE 40 MG/ML
40 INJECTION, SUSPENSION INTRA-ARTICULAR; INTRAMUSCULAR ONCE
Status: COMPLETED | OUTPATIENT
Start: 2024-09-19 | End: 2024-09-19

## 2024-09-19 RX ORDER — HYDROXYZINE HYDROCHLORIDE 10 MG/1
10 TABLET, FILM COATED ORAL EVERY 6 HOURS PRN
Qty: 30 TABLET | Refills: 0 | Status: SHIPPED | OUTPATIENT
Start: 2024-09-19 | End: 2024-09-29

## 2024-09-19 RX ORDER — PREDNISONE 5 MG/1
TABLET ORAL
Qty: 36 TABLET | Refills: 0 | Status: SHIPPED | OUTPATIENT
Start: 2024-09-19 | End: 2024-09-19 | Stop reason: SDUPTHER

## 2024-09-19 RX ADMIN — TRIAMCINOLONE ACETONIDE 40 MG: 40 INJECTION, SUSPENSION INTRA-ARTICULAR; INTRAMUSCULAR at 10:10

## 2025-01-16 SDOH — ECONOMIC STABILITY: TRANSPORTATION INSECURITY
IN THE PAST 12 MONTHS, HAS THE LACK OF TRANSPORTATION KEPT YOU FROM MEDICAL APPOINTMENTS OR FROM GETTING MEDICATIONS?: NO

## 2025-01-16 SDOH — ECONOMIC STABILITY: FOOD INSECURITY: WITHIN THE PAST 12 MONTHS, THE FOOD YOU BOUGHT JUST DIDN'T LAST AND YOU DIDN'T HAVE MONEY TO GET MORE.: NEVER TRUE

## 2025-01-16 SDOH — ECONOMIC STABILITY: FOOD INSECURITY: WITHIN THE PAST 12 MONTHS, YOU WORRIED THAT YOUR FOOD WOULD RUN OUT BEFORE YOU GOT MONEY TO BUY MORE.: NEVER TRUE

## 2025-01-16 SDOH — ECONOMIC STABILITY: INCOME INSECURITY: IN THE LAST 12 MONTHS, WAS THERE A TIME WHEN YOU WERE NOT ABLE TO PAY THE MORTGAGE OR RENT ON TIME?: NO

## 2025-01-16 ASSESSMENT — PATIENT HEALTH QUESTIONNAIRE - PHQ9
SUM OF ALL RESPONSES TO PHQ QUESTIONS 1-9: 0
SUM OF ALL RESPONSES TO PHQ QUESTIONS 1-9: 0
SUM OF ALL RESPONSES TO PHQ9 QUESTIONS 1 & 2: 0
SUM OF ALL RESPONSES TO PHQ QUESTIONS 1-9: 0
SUM OF ALL RESPONSES TO PHQ9 QUESTIONS 1 & 2: 0
1. LITTLE INTEREST OR PLEASURE IN DOING THINGS: NOT AT ALL
SUM OF ALL RESPONSES TO PHQ QUESTIONS 1-9: 0
1. LITTLE INTEREST OR PLEASURE IN DOING THINGS: NOT AT ALL
2. FEELING DOWN, DEPRESSED OR HOPELESS: NOT AT ALL
2. FEELING DOWN, DEPRESSED OR HOPELESS: NOT AT ALL

## 2025-01-16 NOTE — PROGRESS NOTES
Luz Cervantes  1955 01/16/25    SUBJECTIVE:    Continues to smoke and does not want to quit at this time.    Still drinking 3 cokes/day    Hypertension: Stable. Denies CP, SOB, cough, visual changes, dizziness, palpitations or HA.      Lipids:  Has history of high cholesterol, not on medication but trying to continue regular low fat diet and maintenance of weight, regular exercise.  ON RYR.    Borderline high glc noted, likely related to soda/coke intake.  Advised cuttring down carbs.    Lab Results   Component Value Date    LABA1C 5.4 07/25/2023     Lab Results   Component Value Date    CREATININE 1.0 07/25/2023       OBJECTIVE:    /84 (Site: Left Upper Arm, Position: Sitting, Cuff Size: Medium Adult)   Pulse 67   Ht 1.6 m (5' 2.99\")   Wt 73.9 kg (163 lb)   SpO2 97%   BMI 28.88 kg/m²     Physical Exam  Constitutional:       General: She is not in acute distress.     Appearance: She is well-developed. She is not diaphoretic.   HENT:      Head: Normocephalic and atraumatic.   Neck:      Thyroid: No thyromegaly.      Trachea: No tracheal deviation.   Cardiovascular:      Rate and Rhythm: Normal rate and regular rhythm.      Heart sounds: Normal heart sounds. No murmur heard.     No friction rub. No gallop.   Pulmonary:      Effort: No respiratory distress.      Breath sounds: Normal breath sounds. No wheezing or rales.   Abdominal:      General: Bowel sounds are normal. There is no distension.      Palpations: Abdomen is soft. There is no mass.      Tenderness: There is no abdominal tenderness.      Hernia: No hernia is present.   Musculoskeletal:      Right lower leg: No edema.      Left lower leg: No edema.   Lymphadenopathy:      Cervical: No cervical adenopathy.   Psychiatric:         Mood and Affect: Mood normal.         ASSESSMENT:    1. Primary hypertension    2. Tobacco dependence    3. Mixed hyperlipidemia    4. Visit for screening mammogram    5. Age-related osteoporosis without current

## 2025-01-17 ENCOUNTER — TELEPHONE (OUTPATIENT)
Dept: INTERNAL MEDICINE CLINIC | Age: 70
End: 2025-01-17

## 2025-01-17 ENCOUNTER — OFFICE VISIT (OUTPATIENT)
Dept: INTERNAL MEDICINE CLINIC | Age: 70
End: 2025-01-17

## 2025-01-17 VITALS
SYSTOLIC BLOOD PRESSURE: 120 MMHG | OXYGEN SATURATION: 97 % | HEART RATE: 67 BPM | BODY MASS INDEX: 28.88 KG/M2 | HEIGHT: 63 IN | DIASTOLIC BLOOD PRESSURE: 84 MMHG | WEIGHT: 163 LBS

## 2025-01-17 VITALS
DIASTOLIC BLOOD PRESSURE: 84 MMHG | HEIGHT: 62 IN | HEART RATE: 67 BPM | BODY MASS INDEX: 30 KG/M2 | WEIGHT: 163 LBS | SYSTOLIC BLOOD PRESSURE: 120 MMHG

## 2025-01-17 DIAGNOSIS — I10 PRIMARY HYPERTENSION: Primary | ICD-10-CM

## 2025-01-17 DIAGNOSIS — Z00.00 MEDICARE ANNUAL WELLNESS VISIT, SUBSEQUENT: Primary | ICD-10-CM

## 2025-01-17 DIAGNOSIS — F17.200 TOBACCO DEPENDENCE: ICD-10-CM

## 2025-01-17 DIAGNOSIS — M81.0 AGE-RELATED OSTEOPOROSIS WITHOUT CURRENT PATHOLOGICAL FRACTURE: ICD-10-CM

## 2025-01-17 DIAGNOSIS — E78.2 MIXED HYPERLIPIDEMIA: ICD-10-CM

## 2025-01-17 DIAGNOSIS — Z12.31 VISIT FOR SCREENING MAMMOGRAM: ICD-10-CM

## 2025-01-17 RX ORDER — VARENICLINE TARTRATE 0.5 (11)-1
1 KIT ORAL DAILY
Qty: 1 EACH | Refills: 0 | Status: SHIPPED | OUTPATIENT
Start: 2025-01-17

## 2025-01-17 RX ORDER — CARVEDILOL 12.5 MG/1
TABLET ORAL
Qty: 180 TABLET | Refills: 1 | Status: SHIPPED | OUTPATIENT
Start: 2025-01-17

## 2025-01-17 RX ORDER — LOSARTAN POTASSIUM 25 MG/1
37.5 TABLET ORAL DAILY
Qty: 135 TABLET | Refills: 1 | Status: SHIPPED | OUTPATIENT
Start: 2025-01-17 | End: 2025-07-16

## 2025-01-17 RX ORDER — VARENICLINE TARTRATE 1 MG/1
1 TABLET, FILM COATED ORAL 2 TIMES DAILY
Qty: 60 TABLET | Refills: 3 | Status: SHIPPED | OUTPATIENT
Start: 2025-01-17

## 2025-01-17 ASSESSMENT — PATIENT HEALTH QUESTIONNAIRE - PHQ9
1. LITTLE INTEREST OR PLEASURE IN DOING THINGS: NOT AT ALL
SUM OF ALL RESPONSES TO PHQ QUESTIONS 1-9: 0
2. FEELING DOWN, DEPRESSED OR HOPELESS: NOT AT ALL
SUM OF ALL RESPONSES TO PHQ9 QUESTIONS 1 & 2: 0
SUM OF ALL RESPONSES TO PHQ QUESTIONS 1-9: 0

## 2025-01-17 NOTE — TELEPHONE ENCOUNTER
Approved  PA Detail   Prior authorization approved  Payer: [facility] and [employer] - Medicare Case ID: [id number]  Note from payer: PA Case: [id number], Status: Approved, Coverage Starts on: 10/18/2024 12:00:00 AM, Coverage Ends on: 4/17/2025 12:00:00 AM.  Approval Details    Authorized from October 18, 2024 to April 17, 2025  Electronic appeal: Not supported  View History

## 2025-01-17 NOTE — TELEPHONE ENCOUNTER
Approved  PA Detail   Prior authorization approved  Payer: [facility] and [employer] - Medicare Case ID: [id number]  Note from payer: PA Case: [id number], Status: Approved, Coverage Starts on: 10/18/2024 12:00:00 AM, Coverage Ends on: 4/17/2025 12:00:00 AM.  Approval Details    Authorized from October 18, 2024 to April 17, 2025  Electronic appeal: Not supported  View History

## 2025-01-17 NOTE — PATIENT INSTRUCTIONS
Learning About Being Active as an Older Adult  Why is being active important as you get older?     Being active is one of the best things you can do for your health. And it's never too late to start. Being active--or getting active, if you aren't already--has definite benefits. It can:  Give you more energy,  Keep your mind sharp.  Improve balance to reduce your risk of falls.  Help you manage chronic illness with fewer medicines.  No matter how old you are, how fit you are, or what health problems you have, there is a form of activity that will work for you. And the more physical activity you can do, the better your overall health will be.  What kinds of activity can help you stay healthy?  Being more active will make your daily activities easier. Physical activity includes planned exercise and things you do in daily life. There are four types of activity:  Aerobic.  Doing aerobic activity makes your heart and lungs strong.  Includes walking, dancing, and gardening.  Aim for at least 2½ hours spread throughout the week.  It improves your energy and can help you sleep better.  Muscle-strengthening.  This type of activity can help maintain muscle and strengthen bones.  Includes climbing stairs, using resistance bands, and lifting or carrying heavy loads.  Aim for at least twice a week.  It can help protect the knees and other joints.  Stretching.  Stretching gives you better range of motion in joints and muscles.  Includes upper arm stretches, calf stretches, and gentle yoga.  Aim for at least twice a week, preferably after your muscles are warmed up from other activities.  It can help you function better in daily life.  Balancing.  This helps you stay coordinated and have good posture.  Includes heel-to-toe walking, kalen chi, and certain types of yoga.  Aim for at least 3 days a week.  It can reduce your risk of falling.  Even if you have a hard time meeting the recommendations, it's better to be more active

## 2025-01-24 LAB
A/G RATIO: 1.9 RATIO (ref 0.8–2.6)
ALBUMIN: 4.2 G/DL (ref 3.5–5.2)
ALP BLD-CCNC: 122 U/L (ref 23–144)
ALT SERPL-CCNC: 23 U/L (ref 0–60)
AST SERPL-CCNC: 14 U/L (ref 0–55)
BASOPHILS ABSOLUTE: 0 K/UL (ref 0–0.3)
BASOPHILS RELATIVE PERCENT: 0.3 % (ref 0–2)
BILIRUB SERPL-MCNC: 0.9 MG/DL (ref 0–1.2)
BUN / CREAT RATIO: 16 (ref 7–25)
BUN BLDV-MCNC: 14 MG/DL (ref 3–29)
CALCIUM SERPL-MCNC: 9.9 MG/DL (ref 8.5–10.5)
CHLORIDE BLD-SCNC: 105 MEQ/L (ref 96–110)
CHOLESTEROL, TOTAL: 248 MG/DL
CO2: 26 MEQ/L (ref 19–32)
CREAT SERPL-MCNC: 0.9 MG/DL (ref 0.5–1.2)
DIFFERENTIAL COUNT: NORMAL
EOSINOPHILS ABSOLUTE: 0.1 K/UL (ref 0–0.5)
EOSINOPHILS RELATIVE PERCENT: 0.8 % (ref 0–5)
ESTIMATED GLOMERULAR FILTRATION RATE CREATININE EQUATION: 70 MLS/MIN/1.73M2
FASTING STATUS: NORMAL
GLOBULIN: 2.2 G/DL (ref 1.9–3.6)
GLUCOSE BLD-MCNC: 88 MG/DL (ref 70–99)
HCT VFR BLD CALC: 42.9 % (ref 34–49)
HDLC SERPL-MCNC: 59 MG/DL
HEMOGLOBIN: 14.7 G/DL (ref 11.2–15.7)
IMMATURE GRANS (ABS): 0.1 K/UL (ref 0–0.1)
IMMATURE GRANULOCYTES %: 0.6 %
LDL CHOLESTEROL: 153 MG/DL
LYMPHOCYTES ABSOLUTE: 2.7 K/UL (ref 0.9–4.1)
LYMPHOCYTES RELATIVE PERCENT: 31 % (ref 14–51)
MCH RBC QN AUTO: 32.3 PG (ref 26–34)
MCHC RBC AUTO-ENTMCNC: 34.3 G/DL (ref 30.7–35.5)
MCV RBC AUTO: 94.3 FL (ref 80–100)
MONOCYTES ABSOLUTE: 0.8 K/UL (ref 0.2–1)
MONOCYTES RELATIVE PERCENT: 9.2 % (ref 4–12)
NEUTROPHILS ABSOLUTE: 5.1 K/UL (ref 1.8–7.5)
NEUTROPHILS RELATIVE PERCENT: 58.1 % (ref 42–80)
PDW BLD-RTO: 11.9 %
PLATELET # BLD: 293 K/UL (ref 140–400)
PMV BLD AUTO: 9.8 FL (ref 7.2–11.7)
POTASSIUM SERPL-SCNC: 4.2 MEQ/L (ref 3.4–5.3)
RBC # BLD: 4.55 M/UL (ref 3.95–5.26)
RETICULOCYTE ABSOLUTE COUNT: 0 /100 WBC
SODIUM BLD-SCNC: 143 MEQ/L (ref 135–148)
TOTAL PROTEIN: 6.4 G/DL (ref 6–8.3)
TRIGL SERPL-MCNC: 180 MG/DL
TSH ULTRASENSITIVE: 1.01 MCIU/ML (ref 0.4–4.5)
VITAMIN D 25-HYDROXY: 11 NG/ML (ref 30–100)
VLDLC SERPL CALC-MCNC: 36 MG/DL (ref 4–38)
WBC # BLD: 8.7 K/UL (ref 3.5–10.9)

## 2025-01-29 ENCOUNTER — TELEPHONE (OUTPATIENT)
Dept: INTERNAL MEDICINE CLINIC | Age: 70
End: 2025-01-29

## 2025-01-29 DIAGNOSIS — E55.9 VITAMIN D DEFICIENCY: Primary | ICD-10-CM

## 2025-01-29 NOTE — TELEPHONE ENCOUNTER
----- Message from Dr. Raul Hoff MD sent at 1/28/2025  9:15 AM EST -----  Please call pt, chol improving gradually, from prior [id number], thyroid, sugar and other lab ok EXC VIT D IS VERY LOW.  CHECK IF IS ON VIT D 1000 UNITS/DAY, IF NOT RESTART, IF IS ON THIS THEN INCR TO 2000 UNITS/DAY.  NEED RECHECK VIT D LEVEL IN TWO MONTHS TO SEE IF NORMALIZES, DX VIT D DEFIC

## 2025-04-03 ENCOUNTER — COMMUNITY OUTREACH (OUTPATIENT)
Dept: INTERNAL MEDICINE CLINIC | Age: 70
End: 2025-04-03

## 2025-04-03 NOTE — PROGRESS NOTES
Patient's HM shows they are overdue for Mammogram.  Care Everywhere and  files searched.  No results to attach to order nor HM updated.     Ordered 1/2025, called x3, no return call to schedule.

## 2025-07-17 NOTE — PROGRESS NOTES
Luz Cervantes  1955 07/18/25    SUBJECTIVE:   Still 3 cokes/day.      Hypertension: Stable. Denies CP, SOB, cough, visual changes, dizziness, palpitations or BLAIR.      Seeing Dr Oliva now prn.    Still working 20 hrs/week.    Vit d defic, last levels very low at 11, she'll start taking supplement more regularly and we'll f/u one month.    Ifg, we'll f/u lab.  Lab Results   Component Value Date    LABA1C 5.4 07/25/2023     Lab Results   Component Value Date    CREATININE 0.9 01/24/2025     Continues to smoke and does not want to quit at this time.    OBJECTIVE:    /64 (BP Site: Left Upper Arm, Patient Position: Sitting, BP Cuff Size: Medium Adult)   Pulse 65   Wt 73.6 kg (162 lb 3.2 oz)   SpO2 97%   BMI 29.67 kg/m²     Physical Exam  Constitutional:       General: She is not in acute distress.     Appearance: She is well-developed. She is not diaphoretic.   HENT:      Head: Normocephalic and atraumatic.   Neck:      Thyroid: No thyromegaly.      Trachea: No tracheal deviation.   Cardiovascular:      Rate and Rhythm: Normal rate and regular rhythm.      Heart sounds: Normal heart sounds. No murmur heard.     No friction rub. No gallop.   Pulmonary:      Effort: No respiratory distress.      Breath sounds: Normal breath sounds. No wheezing or rales.   Abdominal:      General: Bowel sounds are normal. There is no distension.      Palpations: Abdomen is soft. There is no mass.      Tenderness: There is no abdominal tenderness.      Hernia: No hernia is present.   Musculoskeletal:      Right lower leg: No edema.      Left lower leg: No edema.   Lymphadenopathy:      Cervical: No cervical adenopathy.   Psychiatric:         Mood and Affect: Mood normal.         ASSESSMENT:    1. Primary hypertension    2. Mixed hyperlipidemia    3. Tobacco dependence    4. Vitamin D deficiency    5. Age-related osteoporosis without current pathological fracture    6. Visit for screening mammogram        PLAN:  Assessment &

## 2025-07-18 ENCOUNTER — OFFICE VISIT (OUTPATIENT)
Dept: INTERNAL MEDICINE CLINIC | Age: 70
End: 2025-07-18

## 2025-07-18 VITALS
OXYGEN SATURATION: 97 % | SYSTOLIC BLOOD PRESSURE: 136 MMHG | HEART RATE: 65 BPM | WEIGHT: 162.2 LBS | DIASTOLIC BLOOD PRESSURE: 64 MMHG | BODY MASS INDEX: 29.67 KG/M2

## 2025-07-18 DIAGNOSIS — F17.200 TOBACCO DEPENDENCE: ICD-10-CM

## 2025-07-18 DIAGNOSIS — M81.0 AGE-RELATED OSTEOPOROSIS WITHOUT CURRENT PATHOLOGICAL FRACTURE: ICD-10-CM

## 2025-07-18 DIAGNOSIS — E55.9 VITAMIN D DEFICIENCY: ICD-10-CM

## 2025-07-18 DIAGNOSIS — E78.2 MIXED HYPERLIPIDEMIA: ICD-10-CM

## 2025-07-18 DIAGNOSIS — Z12.31 VISIT FOR SCREENING MAMMOGRAM: ICD-10-CM

## 2025-07-18 DIAGNOSIS — I10 PRIMARY HYPERTENSION: Primary | ICD-10-CM

## 2025-07-18 RX ORDER — CARVEDILOL 12.5 MG/1
TABLET ORAL
Qty: 180 TABLET | Refills: 1 | Status: SHIPPED | OUTPATIENT
Start: 2025-07-18

## 2025-07-18 RX ORDER — LOSARTAN POTASSIUM 25 MG/1
37.5 TABLET ORAL DAILY
Qty: 135 TABLET | Refills: 1 | Status: SHIPPED | OUTPATIENT
Start: 2025-07-18 | End: 2026-01-14